# Patient Record
Sex: MALE | Race: WHITE | HISPANIC OR LATINO | Employment: OTHER | URBAN - METROPOLITAN AREA
[De-identification: names, ages, dates, MRNs, and addresses within clinical notes are randomized per-mention and may not be internally consistent; named-entity substitution may affect disease eponyms.]

---

## 2022-03-03 ENCOUNTER — OFFICE VISIT (OUTPATIENT)
Dept: DERMATOLOGY | Facility: CLINIC | Age: 77
End: 2022-03-03
Payer: MEDICARE

## 2022-03-03 ENCOUNTER — TELEPHONE (OUTPATIENT)
Dept: DERMATOLOGY | Facility: CLINIC | Age: 77
End: 2022-03-03
Payer: MEDICARE

## 2022-03-03 DIAGNOSIS — L21.9 SEBORRHEIC DERMATITIS: ICD-10-CM

## 2022-03-03 DIAGNOSIS — D18.01 CHERRY ANGIOMA: ICD-10-CM

## 2022-03-03 DIAGNOSIS — L57.0 ACTINIC KERATOSES: Primary | ICD-10-CM

## 2022-03-03 DIAGNOSIS — L82.1 SEBORRHEIC KERATOSES: ICD-10-CM

## 2022-03-03 PROCEDURE — 17000 PR DESTRUCTION(LASER SURGERY,CRYOSURGERY,CHEMOSURGERY),PREMALIGNANT LESIONS,FIRST LESION: ICD-10-PCS | Mod: S$PBB,,, | Performed by: DERMATOLOGY

## 2022-03-03 PROCEDURE — 17000 DESTRUCT PREMALG LESION: CPT | Mod: S$PBB,,, | Performed by: DERMATOLOGY

## 2022-03-03 PROCEDURE — 99203 OFFICE O/P NEW LOW 30 MIN: CPT | Mod: PBBFAC,PO | Performed by: DERMATOLOGY

## 2022-03-03 PROCEDURE — 99203 OFFICE O/P NEW LOW 30 MIN: CPT | Mod: 25,S$PBB,, | Performed by: DERMATOLOGY

## 2022-03-03 PROCEDURE — 17003 DESTRUCT PREMALG LES 2-14: CPT | Mod: PBBFAC,PO | Performed by: DERMATOLOGY

## 2022-03-03 PROCEDURE — 99999 PR PBB SHADOW E&M-NEW PATIENT-LVL III: ICD-10-PCS | Mod: PBBFAC,,, | Performed by: DERMATOLOGY

## 2022-03-03 PROCEDURE — 17003 DESTRUCT PREMALG LES 2-14: CPT | Mod: S$PBB,,, | Performed by: DERMATOLOGY

## 2022-03-03 PROCEDURE — 17000 DESTRUCT PREMALG LESION: CPT | Mod: PBBFAC,PO | Performed by: DERMATOLOGY

## 2022-03-03 PROCEDURE — 99999 PR PBB SHADOW E&M-NEW PATIENT-LVL III: CPT | Mod: PBBFAC,,, | Performed by: DERMATOLOGY

## 2022-03-03 PROCEDURE — 99203 PR OFFICE/OUTPT VISIT, NEW, LEVL III, 30-44 MIN: ICD-10-PCS | Mod: 25,S$PBB,, | Performed by: DERMATOLOGY

## 2022-03-03 PROCEDURE — 17003 DESTRUCTION, PREMALIGNANT LESIONS; SECOND THROUGH 14 LESIONS: ICD-10-PCS | Mod: S$PBB,,, | Performed by: DERMATOLOGY

## 2022-03-03 RX ORDER — KETOCONAZOLE 20 MG/G
CREAM TOPICAL
Qty: 30 G | Refills: 3 | Status: SHIPPED | OUTPATIENT
Start: 2022-03-03 | End: 2022-08-30 | Stop reason: CLARIF

## 2022-03-03 RX ORDER — VITAMIN E (DL,TOCOPHERYL ACET) 90 MG
400 CAPSULE ORAL DAILY
COMMUNITY

## 2022-03-03 RX ORDER — MAG HYDROX/ALUMINUM HYD/SIMETH 400-400-40
1 SUSPENSION, ORAL (FINAL DOSE FORM) ORAL DAILY
COMMUNITY

## 2022-03-03 RX ORDER — ASPIRIN 81 MG/1
81 TABLET ORAL DAILY
COMMUNITY

## 2022-03-03 RX ORDER — RAMIPRIL 5 MG/1
1 CAPSULE ORAL DAILY
COMMUNITY
Start: 2022-01-10

## 2022-03-03 RX ORDER — CLOPIDOGREL BISULFATE 75 MG/1
1 TABLET ORAL DAILY
COMMUNITY
Start: 2022-02-11

## 2022-03-03 RX ORDER — METOPROLOL TARTRATE 50 MG/1
1 TABLET ORAL 2 TIMES DAILY
COMMUNITY
Start: 2022-01-27

## 2022-03-03 RX ORDER — ATORVASTATIN CALCIUM 80 MG/1
40 TABLET, FILM COATED ORAL DAILY
COMMUNITY
Start: 2022-02-21

## 2022-03-03 NOTE — PATIENT INSTRUCTIONS

## 2022-03-03 NOTE — TELEPHONE ENCOUNTER
Spoke with patients wife and let them know it was okay and they could be seen but they would have to wait.     ----- Message from Carola Corona sent at 3/3/2022  2:08 PM CST -----  Contact: wife  Type: Needs Medical Advice  Who Called:  Clara Flood Call Back Number: [phone]  Additional Information: patients wife wanted to report that he would ne running late for his appt, he is coming from [place]- he will be 15-20 minutes late.

## 2022-03-03 NOTE — PROGRESS NOTES
Subjective:       Patient ID:  Elias Ventura is a 76 y.o. male who presents for   Chief Complaint   Patient presents with    Spot     scalp     New Patient    C/o spot on the scalp  Several months    Patient fell a few days ago, a lot of bruising and scrapes  Treated with triple antibiotic ointment and dermafloss    Current Outpatient Medications:     aspirin (ECOTRIN) 81 MG EC tablet, Take 81 mg by mouth., Disp: , Rfl:     atorvastatin (LIPITOR) 80 MG tablet, Take 1 tablet by mouth once daily., Disp: , Rfl:     clopidogreL (PLAVIX) 75 mg tablet, Take 1 tablet by mouth once daily., Disp: , Rfl:     co-enzyme Q-10 30 mg capsule, Take 30 mg by mouth 3 (three) times daily., Disp: , Rfl:     grape seed extract 150 mg TbSR, Take 3 capsules by mouth once daily., Disp: , Rfl:     Lactobacillus rhamnosus GG (CULTURELLE) 10 billion cell capsule, Take 1 capsule by mouth once daily., Disp: , Rfl:     metoprolol tartrate (LOPRESSOR) 50 MG tablet, Take 1 tablet by mouth 2 (two) times daily., Disp: , Rfl:     ramipriL (ALTACE) 5 MG capsule, Take 1 capsule by mouth once daily., Disp: , Rfl:     saw palmetto 450 mg Cap, Take by mouth., Disp: , Rfl:     TURMERIC ORAL, Take by mouth., Disp: , Rfl:         Review of Systems   Constitutional: Negative for fever, chills and fatigue.   Respiratory: Negative for cough and shortness of breath.    Skin: Positive for activity-related sunscreen use and wears hat. Negative for daily sunscreen use.        Objective:    Physical Exam   Constitutional: He appears well-developed and well-nourished. No distress.   Neurological: He is alert and oriented to person, place, and time. He is not disoriented.   Psychiatric: He has a normal mood and affect.   Skin:   Areas Examined (abnormalities noted in diagram):   Scalp / Hair Palpated and Inspected  Head / Face Inspection Performed  Neck Inspection Performed  Chest / Axilla Inspection Performed  Abdomen Inspection Performed  Back  Inspection Performed  RUE Inspected  LUE Inspection Performed  Nails and Digits Inspection Performed                       Diagram Legend     Erythematous scaling macule/papule c/w actinic keratosis       Vascular papule c/w angioma      Pigmented verrucoid papule/plaque c/w seborrheic keratosis      Yellow umbilicated papule c/w sebaceous hyperplasia      Irregularly shaped tan macule c/w lentigo     1-2 mm smooth white papules consistent with Milia      Movable subcutaneous cyst with punctum c/w epidermal inclusion cyst      Subcutaneous movable cyst c/w pilar cyst      Firm pink to brown papule c/w dermatofibroma      Pedunculated fleshy papule(s) c/w skin tag(s)      Evenly pigmented macule c/w junctional nevus     Mildly variegated pigmented, slightly irregular-bordered macule c/w mildly atypical nevus      Flesh colored to evenly pigmented papule c/w intradermal nevus       Pink pearly papule/plaque c/w basal cell carcinoma      Erythematous hyperkeratotic cursted plaque c/w SCC      Surgical scar with no sign of skin cancer recurrence      Open and closed comedones      Inflammatory papules and pustules      Verrucoid papule consistent consistent with wart     Erythematous eczematous patches and plaques     Dystrophic onycholytic nail with subungual debris c/w onychomycosis     Umbilicated papule    Erythematous-base heme-crusted tan verrucoid plaque consistent with inflamed seborrheic keratosis     Erythematous Silvery Scaling Plaque c/w Psoriasis     See annotation      Assessment / Plan:        Actinic keratoses  Cryosurgery Procedure Note    Verbal consent from the patient is obtained and the patient is aware of the precancerous quality and need for treatment of these lesions. Liquid nitrogen cryosurgery is applied to the 3 actinic keratoses, as detailed in the physical exam, to produce a freeze injury. The patient is aware that blisters may form and is instructed on wound care with gentle cleansing and  use of vaseline ointment to keep moist until healed. The patient is supplied a handout on cryosurgery and is instructed to call if lesions do not completely resolve.    Discussed field treatment with fluorouracil cream if more lesions develo    Patient instructed in importance in daily broad spectrum sun protection of at least spf 30. Mineral sunscreen ingredients preferred (Zinc +/- Titanium) and can be found OTC.   Recommend Elta MD for daily use on face and neck.  Patient encouraged to wear hat for all outdoor exposure.   Also discussed sun avoidance and use of protective clothing.    Seborrheic dermatitis  Mild, central face  Add keto cream to central face as needed for flare  May add OTC hydrocortisone as well    Cherry angioma  This is a benign vascular lesion. Reassurance given. No treatment required.     Seborrheic keratoses  These are benign inherited growths without a malignant potential. Reassurance given to patient. No treatment is necessary.     Patient instructed in importance in daily broad spectrum sun protection of at least spf 30. Mineral sunscreen ingredients preferred (Zinc +/- Titanium) and can be found OTC.   Recommend Elta MD for daily use on face and neck.  Patient encouraged to wear hat for all outdoor exposure.   Also discussed sun avoidance and use of protective clothing.         Follow up in about 6 months (around 9/3/2022), or if symptoms worsen or fail to improve.

## 2023-03-27 PROBLEM — I70.0 AORTIC ATHEROSCLEROSIS: Status: ACTIVE | Noted: 2023-03-27

## 2023-05-23 ENCOUNTER — OFFICE VISIT (OUTPATIENT)
Dept: CARDIOLOGY | Facility: CLINIC | Age: 78
End: 2023-05-23
Payer: MEDICARE

## 2023-05-23 VITALS
BODY MASS INDEX: 28.09 KG/M2 | WEIGHT: 200.63 LBS | DIASTOLIC BLOOD PRESSURE: 62 MMHG | SYSTOLIC BLOOD PRESSURE: 138 MMHG | HEART RATE: 67 BPM | HEIGHT: 71 IN

## 2023-05-23 DIAGNOSIS — R06.02 SOB (SHORTNESS OF BREATH): ICD-10-CM

## 2023-05-23 DIAGNOSIS — R09.89 RIGHT CAROTID BRUIT: ICD-10-CM

## 2023-05-23 DIAGNOSIS — E78.00 PURE HYPERCHOLESTEROLEMIA: Chronic | ICD-10-CM

## 2023-05-23 DIAGNOSIS — I25.10 CORONARY ARTERY DISEASE DUE TO LIPID RICH PLAQUE: Primary | ICD-10-CM

## 2023-05-23 DIAGNOSIS — Z79.02 LONG TERM (CURRENT) USE OF ANTITHROMBOTICS/ANTIPLATELETS: ICD-10-CM

## 2023-05-23 DIAGNOSIS — I25.83 CORONARY ARTERY DISEASE DUE TO LIPID RICH PLAQUE: Primary | ICD-10-CM

## 2023-05-23 DIAGNOSIS — I10 PRIMARY HYPERTENSION: Chronic | ICD-10-CM

## 2023-05-23 DIAGNOSIS — I35.1 NONRHEUMATIC AORTIC VALVE INSUFFICIENCY: ICD-10-CM

## 2023-05-23 DIAGNOSIS — E66.3 OVERWEIGHT (BMI 25.0-29.9): Chronic | ICD-10-CM

## 2023-05-23 DIAGNOSIS — I70.0 AORTIC ATHEROSCLEROSIS: Chronic | ICD-10-CM

## 2023-05-23 DIAGNOSIS — I20.9 ANGINA PECTORIS, UNSPECIFIED: ICD-10-CM

## 2023-05-23 PROCEDURE — 93005 ELECTROCARDIOGRAM TRACING: CPT | Mod: PO

## 2023-05-23 PROCEDURE — 99215 OFFICE O/P EST HI 40 MIN: CPT | Mod: PBBFAC,PO | Performed by: INTERNAL MEDICINE

## 2023-05-23 PROCEDURE — 99999 PR PBB SHADOW E&M-EST. PATIENT-LVL V: ICD-10-PCS | Mod: PBBFAC,,, | Performed by: INTERNAL MEDICINE

## 2023-05-23 PROCEDURE — 93010 ELECTROCARDIOGRAM REPORT: CPT | Mod: ,,, | Performed by: INTERNAL MEDICINE

## 2023-05-23 PROCEDURE — 99204 OFFICE O/P NEW MOD 45 MIN: CPT | Mod: 25,S$PBB,, | Performed by: INTERNAL MEDICINE

## 2023-05-23 PROCEDURE — 99999 PR PBB SHADOW E&M-EST. PATIENT-LVL V: CPT | Mod: PBBFAC,,, | Performed by: INTERNAL MEDICINE

## 2023-05-23 PROCEDURE — 93010 EKG 12-LEAD: ICD-10-PCS | Mod: ,,, | Performed by: INTERNAL MEDICINE

## 2023-05-23 PROCEDURE — 99204 PR OFFICE/OUTPT VISIT, NEW, LEVL IV, 45-59 MIN: ICD-10-PCS | Mod: 25,S$PBB,, | Performed by: INTERNAL MEDICINE

## 2023-05-23 NOTE — PROGRESS NOTES
Subjective:    Patient ID:  Elias Ventura is a 77 y.o. male who presents for Coronary Artery Disease and Valvular Heart Disease        HPI  NEW PATIENT EVALUATION WAS FOLLOWED BY DR. NUÑEZ HISTORY OF NON STEMI PCI OF THE LAD WITH INSTENT RESTENOSIS HAD RESIDUAL LAD DISEASE AND RCA DISEASE WAS TO BE BROUGHT BACK, ECHO AT THAT TIME EF 50% MILD AI, TOLD NEEDS CABG, PT DOES NOT LIKE IT, NO SX,IN CARDIAC REHAB,NO TOBACCO FOR MANY YEARS, CBC OK, , CMP OK, ,LDL 50, HDL 48, , TG 53, TSH OK,  VERY INTERESTED SUPPLEMENTS AND NONTRADITIONAL MEDICINE SEE ROS    Past Medical History:   Diagnosis Date    Coronary artery disease     Hyperlipemia     Hypertension      Past Surgical History:   Procedure Laterality Date    LEFT HEART CATHETERIZATION Left 9/1/2022    Procedure: Left heart cath;  Surgeon: Kiko Alonzo MD;  Location: [facility] CATH;  Service: Cardiovascular;  Laterality: Left;     No family history on file.  Social History     Socioeconomic History    Marital status:    Tobacco Use    Smoking status: Never    Smokeless tobacco: Never   Substance and Sexual Activity    Alcohol use: Not Currently    Drug use: Never       Review of patient's allergies indicates:  No Known Allergies    Current Outpatient Medications:     aspirin (ECOTRIN) 81 MG EC tablet, Take 81 mg by mouth once daily., Disp: , Rfl:     atorvastatin (LIPITOR) 80 MG tablet, Take 40 mg by mouth once daily., Disp: , Rfl:     cholecalciferol, vitamin D3, 125 mcg (5,000 unit) capsule, Take 5,000 Units by mouth once daily., Disp: , Rfl:     clopidogreL (PLAVIX) 75 mg tablet, Take 1 tablet by mouth once daily., Disp: , Rfl:     coenzyme Q10 400 mg Cap, Take 400 mg by mouth once daily., Disp: , Rfl:     glucosamine/chondr acharya A sod (OSTEO BI-FLEX ORAL), Take 1 capsule by mouth once daily., Disp: , Rfl:     grape seed extract 150 mg TbSR, Take 3 capsules by mouth once daily., Disp: , Rfl:     Lactobacillus rhamnosus GG (CULTURELLE) 10 billion cell  capsule, Take 1 capsule by mouth once daily., Disp: , Rfl:     lutein 20 mg Cap, Take 1 capsule by mouth once daily., Disp: , Rfl:     metoprolol tartrate (LOPRESSOR) 50 MG tablet, Take 1 tablet by mouth 2 (two) times daily., Disp: , Rfl:     mv-min/vit C/glut/lysine/hb124 (IMMUNE SUPPORT ORAL), Take 1 tablet by mouth once daily., Disp: , Rfl:     omega-3/dha/epa/fish oil (FISH OIL HIGH POTENCY ORAL), Take 1 capsule by mouth once daily., Disp: , Rfl:     ramipriL (ALTACE) 5 MG capsule, Take 1 capsule by mouth once daily., Disp: , Rfl:     resveratroL 100 mg Cap, Take by mouth., Disp: , Rfl:     saw palmetto 450 mg Cap, Take 1 capsule by mouth once daily., Disp: , Rfl:     tamsulosin (FLOMAX) 0.4 mg Cap, Take 1 capsule by mouth once daily., Disp: , Rfl:     vitamin E 400 UNIT capsule, Take 400 Units by mouth once daily., Disp: , Rfl:     zinc gluconate 50 mg tablet, Take 50 mg by mouth once daily., Disp: , Rfl:     Review of Systems   Constitutional: Negative for chills, decreased appetite, diaphoresis, fever, malaise/fatigue and night sweats.   HENT:  Positive for hearing loss. Negative for congestion and nosebleeds.    Eyes:  Negative for blurred vision and visual disturbance.   Cardiovascular:  Negative for chest pain, claudication, cyanosis, dyspnea on exertion, irregular heartbeat, leg swelling (OCC R AVRICOSE VEINS), near-syncope, orthopnea, palpitations, paroxysmal nocturnal dyspnea and syncope.   Endocrine: Negative for polyphagia and polyuria.   Hematologic/Lymphatic: Negative for adenopathy. Does not bruise/bleed easily.   Musculoskeletal:  Positive for back pain (/ SCIATICA). Negative for falls and joint swelling.   Gastrointestinal:  Negative for abdominal pain, dysphagia, heartburn, jaundice, melena, nausea and vomiting.   Genitourinary:  Negative for dysuria and flank pain.   Neurological:  Positive for paresthesias. Negative for brief paralysis, dizziness, focal weakness, headaches, loss of balance and  "weakness.   Psychiatric/Behavioral:  Negative for altered mental status and depression.    Allergic/Immunologic: Negative for hives and persistent infections.      Objective:      Vitals:    05/23/23 1535 05/23/23 1604   BP: (!) 153/74 138/62   Pulse: 67    Weight: 91 kg (200 lb 9.9 oz)    Height: 5' 11" (1.803 m)    PainSc: 0-No pain      Body mass index is 27.98 kg/m².    Physical Exam  Constitutional:       Appearance: Normal appearance.   HENT:      Head: Normocephalic and atraumatic.   Eyes:      Extraocular Movements: Extraocular movements intact.      Conjunctiva/sclera: Conjunctivae normal.   Neck:      Vascular: Carotid bruit present. No JVD.   Cardiovascular:      Rate and Rhythm: Normal rate and regular rhythm. No extrasystoles are present.     Pulses:           Carotid pulses are 2+ on the right side with bruit and 2+ on the left side.       Radial pulses are 2+ on the right side and 2+ on the left side.        Posterior tibial pulses are 2+ on the right side and 2+ on the left side.      Heart sounds: Murmur heard.   Diastolic murmur is present with a grade of 2/4 at the upper right sternal border.     No friction rub. No gallop.   Pulmonary:      Effort: Pulmonary effort is normal.      Breath sounds: Normal breath sounds and air entry.   Abdominal:      Palpations: Abdomen is soft.      Tenderness: There is no abdominal tenderness.   Musculoskeletal:      Cervical back: Neck supple.      Right lower leg: No edema.      Left lower leg: No edema.   Neurological:      General: No focal deficit present.      Mental Status: He is alert and oriented to person, place, and time.      Cranial Nerves: Cranial nerve deficit ([place]) present.   Psychiatric:         Mood and Affect: Mood normal.         Speech: Speech normal.         Behavior: Behavior normal.               ..    Chemistry        Component Value Date/Time     09/01/2022 0847    K 3.7 09/01/2022 0847     09/01/2022 0847    CO2 25 " 09/01/2022 0847    BUN 13 09/01/2022 0847    CREATININE 0.77 09/01/2022 0847    GLU 87 09/01/2022 0847        Component Value Date/Time    CALCIUM 7.7 (L) 09/01/2022 0847    ALKPHOS 47 09/01/2022 0847    AST 55 09/01/2022 0847    ALT 19 09/01/2022 0847    BILITOT 1.3 09/01/2022 0847            ..No results found for: CHOL  No results found for: HDL  No results found for: LDLCALC  No results found for: TRIG  No results found for: CHOLHDL  ..  Lab Results   Component Value Date    WBC 8.50 09/01/2022    HGB 13.8 (L) 09/01/2022    HCT 39.8 (L) 09/01/2022    MCV 87 09/01/2022     09/01/2022       Test(s) Reviewed  I have reviewed the following in detail:  [] Stress test   [x] Angiography   [x] Echocardiogram   [x] Labs   [] Other:       Assessment:         ICD-10-CM ICD-9-CM   1. Coronary artery disease due to lipid rich plaque  I25.10 414.00    I25.83 414.3   2. Nonrheumatic aortic valve insufficiency  I35.1 424.1   3. Long term (current) use of antithrombotics/antiplatelets  Z79.02 V58.63   4. Aortic atherosclerosis  I70.0 440.0   5. Primary hypertension  I10 401.9   6. Pure hypercholesterolemia  E78.00 272.0   7. Overweight (BMI 25.0-29.9)  E66.3 278.02   8. Right carotid bruit  R09.89 785.9   9. SOB (shortness of breath)  R06.02 786.05   10. Angina pectoris, unspecified  I20.9 413.9     Problem List Items Addressed This Visit          Cardiac/Vascular    Coronary artery disease - Primary    Relevant Orders    IN OFFICE EKG 12-LEAD (to [place])    Echo    Nuclear Stress - Cardiology Interpreted    Hyperlipemia    Hypertension    Aortic atherosclerosis    Nonrheumatic aortic valve insufficiency    Relevant Orders    Echo    Right carotid bruit    Relevant Orders    CV Ultrasound Bilateral Doppler Carotid       Hematology    Long term (current) use of antithrombotics/antiplatelets       Endocrine    Overweight (BMI 25.0-29.9)     Other Visit Diagnoses       SOB (shortness of breath)        Angina pectoris,  unspecified        Relevant Orders    Nuclear Stress - Cardiology Interpreted             Plan:         EKG SR SEPTAL Q, WILL NEED FURTHER EVALUATION CHECK LABS ECHO NUCLEAR STRESS TEST TO ASSESS FOR ISCHEMIA CAROTID ULTRASOUND WATCH BLOOD PRESSURE, VERY LONG DISCUSSION WITH THE PATIENT ABOUT NONTRADITIONAL MEDICATIONS AND SUPPLEMENTS, ALSO ASKED ABOUT CORRELATION, NO INDICATION FOR THAT, NO CLEAR ANGINA NO OVERT HEART FAILURE NO TIA TYPE SYMPTOMS NO NEAR-SYNCOPE DIET EXERCISE DISCUSSED PLAN WITH THE PATIENT HIS WIFE RETURN TO CLINIC IN FEW WEEKS AFTER TESTS   Coronary artery disease due to lipid rich plaque  -     IN OFFICE EKG 12-LEAD (to [place])  -     Echo  -     Nuclear Stress - Cardiology Interpreted; Future    Nonrheumatic aortic valve insufficiency  -     Echo    Long term (current) use of antithrombotics/antiplatelets    Aortic atherosclerosis    Primary hypertension    Pure hypercholesterolemia    Overweight (BMI 25.0-29.9)    Right carotid bruit  -     CV Ultrasound Bilateral Doppler Carotid; Future    SOB (shortness of breath)    Angina pectoris, unspecified  -     Nuclear Stress - Cardiology Interpreted; Future    RTC Low level/low impact aerobic exercise 5x's/wk. Heart healthy diet and risk factor modification.    See labs and med orders.    Aerobic exercise 5x's/wk. Heart healthy diet and risk factor modification.    See labs and med orders.

## 2023-05-30 ENCOUNTER — PATIENT MESSAGE (OUTPATIENT)
Dept: CARDIOLOGY | Facility: HOSPITAL | Age: 78
End: 2023-05-30
Payer: MEDICARE

## 2023-06-01 ENCOUNTER — CLINICAL SUPPORT (OUTPATIENT)
Dept: CARDIOLOGY | Facility: HOSPITAL | Age: 78
End: 2023-06-01
Attending: INTERNAL MEDICINE
Payer: MEDICARE

## 2023-06-01 ENCOUNTER — HOSPITAL ENCOUNTER (OUTPATIENT)
Dept: RADIOLOGY | Facility: HOSPITAL | Age: 78
Discharge: HOME | End: 2023-06-01
Attending: INTERNAL MEDICINE
Payer: MEDICARE

## 2023-06-01 ENCOUNTER — TELEPHONE (OUTPATIENT)
Dept: CARDIOLOGY | Facility: CLINIC | Age: 78
End: 2023-06-01
Payer: MEDICARE

## 2023-06-01 VITALS — WEIGHT: 200 LBS | HEIGHT: 71 IN | BODY MASS INDEX: 28 KG/M2

## 2023-06-01 DIAGNOSIS — I20.9 ANGINA PECTORIS, UNSPECIFIED: ICD-10-CM

## 2023-06-01 DIAGNOSIS — I25.10 CORONARY ARTERY DISEASE DUE TO LIPID RICH PLAQUE: Chronic | ICD-10-CM

## 2023-06-01 DIAGNOSIS — I25.83 CORONARY ARTERY DISEASE DUE TO LIPID RICH PLAQUE: Chronic | ICD-10-CM

## 2023-06-01 LAB
CV PHARM DOSE: 0.4 MG
CV STRESS BASE HR: 64 BPM
DIASTOLIC BLOOD PRESSURE: 64 MMHG
NUC REST EJECTION FRACTION: 62
OHS CV CPX 1 MINUTE RECOVERY HEART RATE: 82 BPM
OHS CV CPX 85 PERCENT MAX PREDICTED HEART RATE MALE: 122
OHS CV CPX MAX PREDICTED HEART RATE: 143
OHS CV CPX PATIENT IS FEMALE: 0
OHS CV CPX PATIENT IS MALE: 1
OHS CV CPX PEAK DIASTOLIC BLOOD PRESSURE: 64 MMHG
OHS CV CPX PEAK HEAR RATE: 82 BPM
OHS CV CPX PEAK RATE PRESSURE PRODUCT: NORMAL
OHS CV CPX PEAK SYSTOLIC BLOOD PRESSURE: 155 MMHG
OHS CV CPX PERCENT MAX PREDICTED HEART RATE ACHIEVED: 57
OHS CV CPX RATE PRESSURE PRODUCT PRESENTING: 9920
OHS CV PHARM TIME: 1505 MIN
SYSTOLIC BLOOD PRESSURE: 155 MMHG

## 2023-06-01 PROCEDURE — 93017 CV STRESS TEST TRACING ONLY: CPT | Mod: PO

## 2023-06-01 PROCEDURE — 63600175 PHARM REV CODE 636 W HCPCS: Mod: PO | Performed by: INTERNAL MEDICINE

## 2023-06-01 PROCEDURE — 93016 CV STRESS TEST SUPVJ ONLY: CPT | Mod: ,,, | Performed by: INTERNAL MEDICINE

## 2023-06-01 PROCEDURE — A9502 TC99M TETROFOSMIN: HCPCS | Mod: PO

## 2023-06-01 PROCEDURE — 93016 NUCLEAR STRESS - CARDIOLOGY INTERPRETED (CUPID ONLY): ICD-10-PCS | Mod: ,,, | Performed by: INTERNAL MEDICINE

## 2023-06-01 PROCEDURE — 78452 HT MUSCLE IMAGE SPECT MULT: CPT | Mod: 26,,, | Performed by: INTERNAL MEDICINE

## 2023-06-01 PROCEDURE — 93018 PR CARDIAC STRESS TST,INTERP/REPT ONLY: ICD-10-PCS | Mod: ,,, | Performed by: INTERNAL MEDICINE

## 2023-06-01 PROCEDURE — 78452 NUCLEAR STRESS - CARDIOLOGY INTERPRETED (CUPID ONLY): ICD-10-PCS | Mod: 26,,, | Performed by: INTERNAL MEDICINE

## 2023-06-01 PROCEDURE — 78452 HT MUSCLE IMAGE SPECT MULT: CPT | Mod: PO

## 2023-06-01 PROCEDURE — 93018 CV STRESS TEST I&R ONLY: CPT | Mod: ,,, | Performed by: INTERNAL MEDICINE

## 2023-06-01 RX ORDER — REGADENOSON 0.08 MG/ML
0.4 INJECTION, SOLUTION INTRAVENOUS
Status: COMPLETED | OUTPATIENT
Start: 2023-06-01 | End: 2023-06-01

## 2023-06-01 RX ADMIN — REGADENOSON 0.4 MG: 0.08 INJECTION, SOLUTION INTRAVENOUS at 03:06

## 2023-06-01 NOTE — TELEPHONE ENCOUNTER
----- Message from Felisha Lauren sent at 6/1/2023 10:55 AM CDT -----  Regarding: advise  Contact: patient  Type: Needs Medical Advice  Who Called:  Patient  Symptoms (please be specific):    How long has patient had these symptoms:   Pharmacy name and phone #:    Best Call Back Number: [phone]    Additional Information: I have Elias Caldwellr MRN: [id number] inquiring about a test he has to take. Are you available to assist?

## 2023-06-13 ENCOUNTER — CLINICAL SUPPORT (OUTPATIENT)
Dept: CARDIOLOGY | Facility: HOSPITAL | Age: 78
End: 2023-06-13
Attending: INTERNAL MEDICINE
Payer: MEDICARE

## 2023-06-13 VITALS
BODY MASS INDEX: 28 KG/M2 | HEIGHT: 71 IN | DIASTOLIC BLOOD PRESSURE: 62 MMHG | WEIGHT: 200 LBS | SYSTOLIC BLOOD PRESSURE: 138 MMHG

## 2023-06-13 DIAGNOSIS — R09.89 RIGHT CAROTID BRUIT: ICD-10-CM

## 2023-06-13 LAB
ASCENDING AORTA: 3.56 CM
AV INDEX (PROSTH): 0.83
AV MEAN GRADIENT: 5 MMHG
AV PEAK GRADIENT: 10 MMHG
AV REGURGITATION PRESSURE HALF TIME: 469.43 MS
AV VALVE AREA: 3.25 CM2
AV VELOCITY RATIO: 0.75
BSA FOR ECHO PROCEDURE: 2.13 M2
CV ECHO LV RWT: 0.43 CM
DOP CALC AO PEAK VEL: 1.58 M/S
DOP CALC AO VTI: 34.3 CM
DOP CALC LVOT AREA: 3.9 CM2
DOP CALC LVOT DIAMETER: 2.24 CM
DOP CALC LVOT PEAK VEL: 1.18 M/S
DOP CALC LVOT STROKE VOLUME: 111.47 CM3
DOP CALCLVOT PEAK VEL VTI: 28.3 CM
E WAVE DECELERATION TIME: 243.3 MSEC
E/A RATIO: 0.79
E/E' RATIO: 12.71 M/S
ECHO LV POSTERIOR WALL: 1.01 CM (ref 0.6–1.1)
EJECTION FRACTION: 60 %
FRACTIONAL SHORTENING: 32 % (ref 28–44)
INTERVENTRICULAR SEPTUM: 1.03 CM (ref 0.6–1.1)
IVRT: 100.86 MSEC
LA MAJOR: 5.15 CM
LA MINOR: 5.19 CM
LA WIDTH: 4.4 CM
LEFT ARM DIASTOLIC BLOOD PRESSURE: 62 MMHG
LEFT ARM SYSTOLIC BLOOD PRESSURE: 138 MMHG
LEFT ATRIUM SIZE: 3.9 CM
LEFT ATRIUM VOLUME INDEX: 35.7 ML/M2
LEFT ATRIUM VOLUME: 75.41 CM3
LEFT CBA DIAS: 11 CM/S
LEFT CBA SYS: 101 CM/S
LEFT CCA DIST DIAS: 9 CM/S
LEFT CCA DIST SYS: 89 CM/S
LEFT CCA MID DIAS: 9 CM/S
LEFT CCA MID SYS: 107 CM/S
LEFT CCA PROX DIAS: 14 CM/S
LEFT CCA PROX SYS: 120 CM/S
LEFT ECA DIAS: 7 CM/S
LEFT ECA SYS: 113 CM/S
LEFT ICA DIST DIAS: 25 CM/S
LEFT ICA DIST SYS: 113 CM/S
LEFT ICA MID DIAS: 21 CM/S
LEFT ICA MID SYS: 99 CM/S
LEFT ICA PROX DIAS: 15 CM/S
LEFT ICA PROX SYS: 97 CM/S
LEFT INTERNAL DIMENSION IN SYSTOLE: 3.2 CM (ref 2.1–4)
LEFT VENTRICLE DIASTOLIC VOLUME INDEX: 48.12 ML/M2
LEFT VENTRICLE DIASTOLIC VOLUME: 101.54 ML
LEFT VENTRICLE MASS INDEX: 80 G/M2
LEFT VENTRICLE SYSTOLIC VOLUME INDEX: 19.4 ML/M2
LEFT VENTRICLE SYSTOLIC VOLUME: 40.88 ML
LEFT VENTRICULAR INTERNAL DIMENSION IN DIASTOLE: 4.68 CM (ref 3.5–6)
LEFT VENTRICULAR MASS: 167.8 G
LEFT VERTEBRAL DIAS: 11 CM/S
LEFT VERTEBRAL SYS: 63 CM/S
LV LATERAL E/E' RATIO: 14.83 M/S
LV SEPTAL E/E' RATIO: 11.13 M/S
LVOT MG: 3.13 MMHG
LVOT MV: 0.84 CM/S
MV PEAK A VEL: 1.13 M/S
MV PEAK E VEL: 0.89 M/S
MV STENOSIS PRESSURE HALF TIME: 70.56 MS
MV VALVE AREA P 1/2 METHOD: 3.12 CM2
OHS CV CAROTID RIGHT ICA EDV HIGHEST: 21
OHS CV CAROTID ULTRASOUND LEFT ICA/CCA RATIO: 1.27
OHS CV CAROTID ULTRASOUND RIGHT ICA/CCA RATIO: 0.98
OHS CV PV CAROTID LEFT HIGHEST CCA: 120
OHS CV PV CAROTID LEFT HIGHEST ICA: 113
OHS CV PV CAROTID RIGHT HIGHEST CCA: 117
OHS CV PV CAROTID RIGHT HIGHEST ICA: 101
OHS CV US CAROTID LEFT HIGHEST EDV: 25
PISA AR MAX VEL: 3.12 M/S
PISA TR MAX VEL: 2.55 M/S
PULM VEIN S/D RATIO: 1.13
PV PEAK D VEL: 0.69 M/S
PV PEAK S VEL: 0.78 M/S
RA MAJOR: 5.08 CM
RA PRESSURE: 3 MMHG
RA WIDTH: 3.7 CM
RIGHT ARM DIASTOLIC BLOOD PRESSURE: 62 MMHG
RIGHT ARM SYSTOLIC BLOOD PRESSURE: 138 MMHG
RIGHT CBA DIAS: 11 CM/S
RIGHT CBA SYS: 89 CM/S
RIGHT CCA DIST DIAS: 10 CM/S
RIGHT CCA DIST SYS: 103 CM/S
RIGHT CCA MID DIAS: 10 CM/S
RIGHT CCA MID SYS: 114 CM/S
RIGHT CCA PROX DIAS: 7 CM/S
RIGHT CCA PROX SYS: 117 CM/S
RIGHT ECA DIAS: 7 CM/S
RIGHT ECA SYS: 169 CM/S
RIGHT ICA DIST DIAS: 20 CM/S
RIGHT ICA DIST SYS: 95 CM/S
RIGHT ICA MID DIAS: 21 CM/S
RIGHT ICA MID SYS: 101 CM/S
RIGHT ICA PROX DIAS: 20 CM/S
RIGHT ICA PROX SYS: 95 CM/S
RIGHT VENTRICULAR END-DIASTOLIC DIMENSION: 3.7 CM
RIGHT VENTRICULAR LENGTH IN DIASTOLE (APICAL 4-CHAMBER VIEW): 7.54 CM
RIGHT VERTEBRAL DIAS: 11 CM/S
RIGHT VERTEBRAL SYS: 46 CM/S
RV MID DIAMA: 2.54 CM
RV TISSUE DOPPLER FREE WALL SYSTOLIC VELOCITY 1 (APICAL 4 CHAMBER VIEW): 0.02 CM/S
SINUS: 3.58 CM
STJ: 3.47 CM
TDI LATERAL: 0.06 M/S
TDI SEPTAL: 0.08 M/S
TDI: 0.07 M/S
TR MAX PG: 26 MMHG
TRICUSPID ANNULAR PLANE SYSTOLIC EXCURSION: 2.43 CM
TV REST PULMONARY ARTERY PRESSURE: 29 MMHG

## 2023-06-13 PROCEDURE — 93880 EXTRACRANIAL BILAT STUDY: CPT | Mod: PO

## 2023-06-13 PROCEDURE — 93880 EXTRACRANIAL BILAT STUDY: CPT | Mod: 26,,, | Performed by: INTERNAL MEDICINE

## 2023-06-13 PROCEDURE — 93306 TTE W/DOPPLER COMPLETE: CPT | Mod: PO

## 2023-06-13 PROCEDURE — 93880 CV US DOPPLER CAROTID (CUPID ONLY): ICD-10-PCS | Mod: 26,,, | Performed by: INTERNAL MEDICINE

## 2023-06-23 ENCOUNTER — PATIENT MESSAGE (OUTPATIENT)
Dept: CARDIOLOGY | Facility: CLINIC | Age: 78
End: 2023-06-23
Payer: MEDICARE

## 2023-08-03 ENCOUNTER — OFFICE VISIT (OUTPATIENT)
Dept: CARDIOLOGY | Facility: CLINIC | Age: 78
End: 2023-08-03
Payer: MEDICARE

## 2023-08-03 VITALS
WEIGHT: 216.06 LBS | DIASTOLIC BLOOD PRESSURE: 66 MMHG | BODY MASS INDEX: 30.25 KG/M2 | HEIGHT: 71 IN | SYSTOLIC BLOOD PRESSURE: 130 MMHG | HEART RATE: 67 BPM

## 2023-08-03 DIAGNOSIS — I65.23 CAROTID ARTERY PLAQUE, BILATERAL: Chronic | ICD-10-CM

## 2023-08-03 DIAGNOSIS — E66.9 OBESITY, CLASS I, BMI 30-34.9: Chronic | ICD-10-CM

## 2023-08-03 DIAGNOSIS — E78.00 PURE HYPERCHOLESTEROLEMIA: Chronic | ICD-10-CM

## 2023-08-03 DIAGNOSIS — I77.810 MILD ASCENDING AORTA DILATATION: ICD-10-CM

## 2023-08-03 DIAGNOSIS — I35.1 NONRHEUMATIC AORTIC VALVE INSUFFICIENCY: Primary | Chronic | ICD-10-CM

## 2023-08-03 DIAGNOSIS — I70.0 AORTIC ATHEROSCLEROSIS: Chronic | ICD-10-CM

## 2023-08-03 DIAGNOSIS — I25.10 CORONARY ARTERY DISEASE INVOLVING NATIVE CORONARY ARTERY OF NATIVE HEART WITHOUT ANGINA PECTORIS: Chronic | ICD-10-CM

## 2023-08-03 DIAGNOSIS — I10 PRIMARY HYPERTENSION: Chronic | ICD-10-CM

## 2023-08-03 PROBLEM — E66.811 OBESITY, CLASS I, BMI 30-34.9: Status: ACTIVE | Noted: 2023-05-23

## 2023-08-03 PROCEDURE — 99999 PR PBB SHADOW E&M-EST. PATIENT-LVL III: CPT | Mod: PBBFAC,,, | Performed by: INTERNAL MEDICINE

## 2023-08-03 PROCEDURE — 99214 PR OFFICE/OUTPT VISIT, EST, LEVL IV, 30-39 MIN: ICD-10-PCS | Mod: S$PBB,,, | Performed by: INTERNAL MEDICINE

## 2023-08-03 PROCEDURE — 99213 OFFICE O/P EST LOW 20 MIN: CPT | Mod: PBBFAC,PO | Performed by: INTERNAL MEDICINE

## 2023-08-03 PROCEDURE — 99999 PR PBB SHADOW E&M-EST. PATIENT-LVL III: ICD-10-PCS | Mod: PBBFAC,,, | Performed by: INTERNAL MEDICINE

## 2023-08-03 PROCEDURE — 99214 OFFICE O/P EST MOD 30 MIN: CPT | Mod: S$PBB,,, | Performed by: INTERNAL MEDICINE

## 2023-08-03 NOTE — PROGRESS NOTES
Subjective:    Patient ID:  Elias Ventura is a 77 y.o. male who presents for Coronary Artery Disease        HPI    DISCUSSED TESTS,  ABNORMAL NUCLEAR STRESS TEST MOSTLY FIXED DEFECT,, ECHO NORMAL LV FUNCTION MILD-TO-MODERATE AI MILDLY TO MODERATELY DILATED ASCENDING AORTA, CAROTID ULTRASOUND WITH MILD BILATERAL PLAQUE NO STENOSIS, DOING WELL, BETTER AFTER REHAB, INTERESTED IN SUPPLEMENTS, SEE ROS  Past Medical History:   Diagnosis Date    Coronary artery disease     Hyperlipemia     Hypertension      Past Surgical History:   Procedure Laterality Date    LEFT HEART CATHETERIZATION Left 9/1/2022    Procedure: Left heart cath;  Surgeon: Kiko Alonzo MD;  Location: [facility] CATH;  Service: Cardiovascular;  Laterality: Left;     No family history on file.  Social History     Socioeconomic History    Marital status:    Tobacco Use    Smoking status: Never    Smokeless tobacco: Never   Substance and Sexual Activity    Alcohol use: Not Currently    Drug use: Never       Review of patient's allergies indicates:  No Known Allergies    Current Outpatient Medications:     aspirin (ECOTRIN) 81 MG EC tablet, Take 81 mg by mouth once daily., Disp: , Rfl:     atorvastatin (LIPITOR) 80 MG tablet, Take 40 mg by mouth once daily., Disp: , Rfl:     cholecalciferol, vitamin D3, 125 mcg (5,000 unit) capsule, Take 5,000 Units by mouth once daily., Disp: , Rfl:     clopidogreL (PLAVIX) 75 mg tablet, Take 1 tablet by mouth once daily., Disp: , Rfl:     coenzyme Q10 400 mg Cap, Take 400 mg by mouth once daily., Disp: , Rfl:     glucosamine/chondr acharya A sod (OSTEO BI-FLEX ORAL), Take 1 capsule by mouth once daily., Disp: , Rfl:     grape seed extract 150 mg TbSR, Take 3 capsules by mouth once daily., Disp: , Rfl:     Lactobacillus rhamnosus GG (CULTURELLE) 10 billion cell capsule, Take 1 capsule by mouth once daily., Disp: , Rfl:     lutein 20 mg Cap, Take 1 capsule by mouth once daily., Disp: , Rfl:     metoprolol tartrate  "(LOPRESSOR) 50 MG tablet, Take 1 tablet by mouth 2 (two) times daily., Disp: , Rfl:     mv-min/vit C/glut/lysine/hb124 (IMMUNE SUPPORT ORAL), Take 1 tablet by mouth once daily., Disp: , Rfl:     omega-3/dha/epa/fish oil (FISH OIL HIGH POTENCY ORAL), Take 1 capsule by mouth once daily., Disp: , Rfl:     ramipriL (ALTACE) 5 MG capsule, Take 1 capsule by mouth once daily., Disp: , Rfl:     resveratroL 100 mg Cap, Take by mouth., Disp: , Rfl:     saw palmetto 450 mg Cap, Take 1 capsule by mouth once daily., Disp: , Rfl:     tamsulosin (FLOMAX) 0.4 mg Cap, Take 1 capsule by mouth once daily., Disp: , Rfl:     vitamin E 400 UNIT capsule, Take 400 Units by mouth once daily., Disp: , Rfl:     zinc gluconate 50 mg tablet, Take 50 mg by mouth once daily., Disp: , Rfl:     Review of Systems   Constitutional: Negative for chills, decreased appetite, diaphoresis, fever, malaise/fatigue and night sweats.   HENT:  Negative for congestion and nosebleeds.    Eyes:  Negative for blurred vision and redness.   Cardiovascular:  Negative for chest pain (OCC TICKLE), claudication, cyanosis, dyspnea on exertion, irregular heartbeat, leg swelling, near-syncope, orthopnea, palpitations, paroxysmal nocturnal dyspnea and syncope.   Endocrine: Negative for polyuria.   Hematologic/Lymphatic: Negative for adenopathy. Does not bruise/bleed easily.   Skin:  Negative for color change and rash.   Musculoskeletal:  Negative for back pain (/ SCIATICA) and falls.   Gastrointestinal:  Negative for abdominal pain, dysphagia, heartburn, jaundice, melena and nausea.   Genitourinary:  Negative for dysuria and flank pain.   Neurological:  Negative for brief paralysis, dizziness, focal weakness, headaches, light-headedness, loss of balance and weakness.   Psychiatric/Behavioral:  Negative for altered mental status and depression.         Objective:      Vitals:    08/03/23 1403   BP: 130/66   Pulse: 67   Weight: 98 kg (216 lb 0.8 oz)   Height: 5' 11" (1.803 m) "   PainSc: 0-No pain     Body mass index is 30.13 kg/m².    Physical Exam  Constitutional:       Appearance: Normal appearance.   HENT:      Head: Normocephalic and atraumatic.   Eyes:      Extraocular Movements: Extraocular movements intact.      Conjunctiva/sclera: Conjunctivae normal.   Cardiovascular:      Rate and Rhythm: Normal rate and regular rhythm. No extrasystoles are present.     Pulses:           Carotid pulses are 2+ on the right side and 2+ on the left side.       Radial pulses are 2+ on the left side.      Heart sounds: Murmur heard.      Diastolic murmur is present.      No friction rub. No gallop.   Pulmonary:      Effort: Pulmonary effort is normal.      Breath sounds: Normal breath sounds and air entry.   Abdominal:      Palpations: Abdomen is soft.      Tenderness: There is no abdominal tenderness.   Musculoskeletal:      Right lower leg: No edema.      Comments: VARICOSE VEINS R > L   Skin:     General: Skin is warm and dry.      Capillary Refill: Capillary refill takes less than 2 seconds.   Neurological:      General: No focal deficit present.      Mental Status: He is alert and oriented to person, place, and time.   Psychiatric:         Mood and Affect: Mood normal.         Behavior: Behavior normal.                 ..    Chemistry        Component Value Date/Time     09/01/2022 0847    K 3.7 09/01/2022 0847     09/01/2022 0847    CO2 25 09/01/2022 0847    BUN 13 09/01/2022 0847    CREATININE 0.77 09/01/2022 0847    GLU 87 09/01/2022 0847        Component Value Date/Time    CALCIUM 7.7 (L) 09/01/2022 0847    ALKPHOS 47 09/01/2022 0847    AST 55 09/01/2022 0847    ALT 19 09/01/2022 0847    BILITOT 1.3 09/01/2022 0847            ..  Lab Results   Component Value Date    CHOL 110 05/08/2023    CHOL 109 09/09/2022    CHOL 116 06/18/2021     Lab Results   Component Value Date    HDL 48 05/08/2023    HDL 42 09/09/2022    HDL 48 06/18/2021     Lab Results   Component Value Date     "LDLCALC 50 05/08/2023    LDLCALC 57 09/09/2022    LDLCALC 56 06/18/2021     Lab Results   Component Value Date    TRIG 53 05/08/2023    TRIG 35 09/09/2022    TRIG 48 06/18/2021     No results found for: "CHOLHDL"  ..  Lab Results   Component Value Date    WBC 5.6 05/08/2023    HGB 14.4 05/08/2023    HCT 43.0 05/08/2023    MCV 87 09/01/2022     05/08/2023       Test(s) Reviewed  I have reviewed the following in detail:  [x] Stress test   [] Angiography   [x] Echocardiogram   [] Labs   [x] Other:       Assessment:         ICD-10-CM ICD-9-CM   1. Nonrheumatic aortic valve insufficiency  I35.1 424.1   2. Carotid artery plaque, bilateral  I65.23 433.10     433.30   3. Mild ascending aorta dilatation  I77.810 447.71   4. Aortic atherosclerosis  I70.0 440.0   5. Primary hypertension  I10 401.9   6. Coronary artery disease involving native coronary artery of native heart without angina pectoris  I25.10 414.01   7. Obesity, Class I, BMI 30-34.9  E66.9 278.00   8. Pure hypercholesterolemia  E78.00 272.0     Problem List Items Addressed This Visit          Cardiac/Vascular    Coronary artery disease    Relevant Orders    Comprehensive Metabolic Panel    Hyperlipemia    Relevant Orders    Comprehensive Metabolic Panel    Hypertension    Relevant Orders    Comprehensive Metabolic Panel    Aortic atherosclerosis    Nonrheumatic aortic valve insufficiency - Primary    Carotid artery plaque, bilateral    Mild ascending aorta dilatation       Endocrine    Obesity, Class I, BMI 30-34.9        Plan:     ALL CV CLINICALLY STABLE, NO ANGINA, NO HF, NO TIA, NO CLINICAL ARRHYTHMIA,CONTINUE CURRENT MEDS, EDUCATION, DIET, EXERCISE , RTC IN 6 MO WITH LABS, DISCUSSED PLAN WITH THE PATIENT AND HIS WIFE, ALL QUESTIONS ANSWERED,       Nonrheumatic aortic valve insufficiency    Carotid artery plaque, bilateral    Mild ascending aorta dilatation  Comments:  BB    Aortic atherosclerosis  Comments:  NO EMBOLIZATION    Primary " hypertension  Comments:  CONTROLLED  Orders:  -     Comprehensive Metabolic Panel; Future; Expected date: 02/03/2024    Coronary artery disease involving native coronary artery of native heart without angina pectoris  -     Comprehensive Metabolic Panel; Future; Expected date: 02/03/2024    Obesity, Class I, BMI 30-34.9    Pure hypercholesterolemia  -     Comprehensive Metabolic Panel; Future; Expected date: 02/03/2024    RTC Low level/low impact aerobic exercise 5x's/wk. Heart healthy diet and risk factor modification.    See labs and med orders.    Aerobic exercise 5x's/wk. Heart healthy diet and risk factor modification.    See labs and med orders.

## 2024-01-26 ENCOUNTER — PATIENT MESSAGE (OUTPATIENT)
Dept: CARDIOLOGY | Facility: CLINIC | Age: 79
End: 2024-01-26
Payer: MEDICARE

## 2024-01-26 DIAGNOSIS — E78.00 PURE HYPERCHOLESTEROLEMIA: Primary | ICD-10-CM

## 2024-01-26 NOTE — TELEPHONE ENCOUNTER
Spoke with pt. Pt sts that they will get CMP and Lipid panel blood work done at [facility]. Pt verbalized understanding of getting blood work done prior to 2/8/24 appt with Dr. Corrales.

## 2024-02-03 LAB
ALBUMIN SERPL-MCNC: 4 G/DL (ref 3.8–4.8)
ALBUMIN/GLOB SERPL: 1.8 {RATIO} (ref 1.2–2.2)
ALP SERPL-CCNC: 62 IU/L (ref 44–121)
ALT SERPL-CCNC: 21 IU/L (ref 0–44)
AST SERPL-CCNC: 25 IU/L (ref 0–40)
BASOPHILS # BLD AUTO: 0.1 X10E3/UL (ref 0–0.2)
BASOPHILS NFR BLD AUTO: 1 %
BILIRUB SERPL-MCNC: 0.8 MG/DL (ref 0–1.2)
BUN SERPL-MCNC: 15 MG/DL (ref 8–27)
BUN/CREAT SERPL: 16 (ref 10–24)
CALCIUM SERPL-MCNC: 9.4 MG/DL (ref 8.6–10.2)
CHLORIDE SERPL-SCNC: 100 MMOL/L (ref 96–106)
CHOLEST SERPL-MCNC: 116 MG/DL (ref 100–199)
CO2 SERPL-SCNC: 24 MMOL/L (ref 20–29)
CREAT SERPL-MCNC: 0.93 MG/DL (ref 0.76–1.27)
EOSINOPHIL # BLD AUTO: 0.2 X10E3/UL (ref 0–0.4)
EOSINOPHIL NFR BLD AUTO: 2 %
ERYTHROCYTE [DISTWIDTH] IN BLOOD BY AUTOMATED COUNT: 12.4 % (ref 11.6–15.4)
EST. GFR  (NO RACE VARIABLE): 84 ML/MIN/1.73
GLOBULIN SER CALC-MCNC: 2.2 G/DL (ref 1.5–4.5)
GLUCOSE SERPL-MCNC: 80 MG/DL (ref 70–99)
HCT VFR BLD AUTO: 44.7 % (ref 37.5–51)
HDLC SERPL-MCNC: 41 MG/DL
HGB BLD-MCNC: 15.1 G/DL (ref 13–17.7)
IMM GRANULOCYTES # BLD AUTO: 0 X10E3/UL (ref 0–0.1)
IMM GRANULOCYTES NFR BLD AUTO: 0 %
LDLC SERPL CALC-MCNC: 61 MG/DL (ref 0–99)
LYMPHOCYTES # BLD AUTO: 1.6 X10E3/UL (ref 0.7–3.1)
LYMPHOCYTES NFR BLD AUTO: 17 %
MCH RBC QN AUTO: 30.6 PG (ref 26.6–33)
MCHC RBC AUTO-ENTMCNC: 33.8 G/DL (ref 31.5–35.7)
MCV RBC AUTO: 91 FL (ref 79–97)
MONOCYTES # BLD AUTO: 0.6 X10E3/UL (ref 0.1–0.9)
MONOCYTES NFR BLD AUTO: 6 %
NEUTROPHILS # BLD AUTO: 6.9 X10E3/UL (ref 1.4–7)
NEUTROPHILS NFR BLD AUTO: 74 %
PLATELET # BLD AUTO: 216 X10E3/UL (ref 150–450)
POTASSIUM SERPL-SCNC: 4.3 MMOL/L (ref 3.5–5.2)
PROT SERPL-MCNC: 6.2 G/DL (ref 6–8.5)
PSA SERPL-MCNC: 1.5 NG/ML (ref 0–4)
RBC # BLD AUTO: 4.94 X10E6/UL (ref 4.14–5.8)
SODIUM SERPL-SCNC: 139 MMOL/L (ref 134–144)
TRIGL SERPL-MCNC: 69 MG/DL (ref 0–149)
VLDLC SERPL CALC-MCNC: 14 MG/DL (ref 5–40)
WBC # BLD AUTO: 9.3 X10E3/UL (ref 3.4–10.8)

## 2024-02-08 ENCOUNTER — TELEPHONE (OUTPATIENT)
Dept: CARDIOLOGY | Facility: CLINIC | Age: 79
End: 2024-02-08
Payer: MEDICARE

## 2024-02-08 ENCOUNTER — OFFICE VISIT (OUTPATIENT)
Dept: CARDIOLOGY | Facility: CLINIC | Age: 79
End: 2024-02-08
Payer: MEDICARE

## 2024-02-08 VITALS
DIASTOLIC BLOOD PRESSURE: 73 MMHG | BODY MASS INDEX: 26.76 KG/M2 | HEIGHT: 72 IN | HEART RATE: 64 BPM | SYSTOLIC BLOOD PRESSURE: 136 MMHG | WEIGHT: 197.56 LBS

## 2024-02-08 DIAGNOSIS — Z79.02 LONG TERM (CURRENT) USE OF ANTITHROMBOTICS/ANTIPLATELETS: ICD-10-CM

## 2024-02-08 DIAGNOSIS — E66.3 OVERWEIGHT (BMI 25.0-29.9): ICD-10-CM

## 2024-02-08 DIAGNOSIS — I70.0 AORTIC ATHEROSCLEROSIS: Chronic | ICD-10-CM

## 2024-02-08 DIAGNOSIS — I77.810 MILD ASCENDING AORTA DILATATION: ICD-10-CM

## 2024-02-08 DIAGNOSIS — I25.10 CORONARY ARTERY DISEASE INVOLVING NATIVE CORONARY ARTERY OF NATIVE HEART WITHOUT ANGINA PECTORIS: Primary | Chronic | ICD-10-CM

## 2024-02-08 DIAGNOSIS — I65.23 CAROTID ARTERY PLAQUE, BILATERAL: ICD-10-CM

## 2024-02-08 DIAGNOSIS — I10 PRIMARY HYPERTENSION: Chronic | ICD-10-CM

## 2024-02-08 DIAGNOSIS — E78.00 PURE HYPERCHOLESTEROLEMIA: Chronic | ICD-10-CM

## 2024-02-08 PROCEDURE — 99213 OFFICE O/P EST LOW 20 MIN: CPT | Mod: PBBFAC,PO | Performed by: INTERNAL MEDICINE

## 2024-02-08 PROCEDURE — 99999 PR PBB SHADOW E&M-EST. PATIENT-LVL III: CPT | Mod: PBBFAC,,, | Performed by: INTERNAL MEDICINE

## 2024-02-08 PROCEDURE — 99214 OFFICE O/P EST MOD 30 MIN: CPT | Mod: S$PBB,,, | Performed by: INTERNAL MEDICINE

## 2024-02-08 NOTE — PROGRESS NOTES
Subjective:    Patient ID:  Elias Ventura is a 78 y.o. male who presents for Follow-up and Results        HPI  DISCUSSED LABS AND GOALS CMP CBC NORMAL, PSA NORMAL REQUESTED PER PATIENT, LDL 61, HDL 41, TRIGLYCERIDE 69, DOING OK,OCC CHEST WALL SORENESS,  SEE ROS    Past Medical History:   Diagnosis Date    Coronary artery disease     Hyperlipemia     Hypertension      Past Surgical History:   Procedure Laterality Date    LEFT HEART CATHETERIZATION Left 9/1/2022    Procedure: Left heart cath;  Surgeon: Kiko Alonzo MD;  Location: [facility] CATH;  Service: Cardiovascular;  Laterality: Left;     No family history on file.  Social History     Socioeconomic History    Marital status:    Tobacco Use    Smoking status: Never    Smokeless tobacco: Never   Substance and Sexual Activity    Alcohol use: Not Currently    Drug use: Never       Review of patient's allergies indicates:  No Known Allergies    Current Outpatient Medications:     aspirin (ECOTRIN) 81 MG EC tablet, Take 81 mg by mouth once daily., Disp: , Rfl:     atorvastatin (LIPITOR) 80 MG tablet, Take 40 mg by mouth once daily., Disp: , Rfl:     cholecalciferol, vitamin D3, 125 mcg (5,000 unit) capsule, Take 5,000 Units by mouth once daily., Disp: , Rfl:     clopidogreL (PLAVIX) 75 mg tablet, Take 1 tablet by mouth once daily., Disp: , Rfl:     coenzyme Q10 400 mg Cap, Take 400 mg by mouth once daily., Disp: , Rfl:     glucosamine/chondr acharya A sod (OSTEO BI-FLEX ORAL), Take 1 capsule by mouth once daily., Disp: , Rfl:     grape seed extract 150 mg TbSR, Take 3 capsules by mouth once daily., Disp: , Rfl:     Lactobacillus rhamnosus GG (CULTURELLE) 10 billion cell capsule, Take 1 capsule by mouth once daily., Disp: , Rfl:     lutein 20 mg Cap, Take 1 capsule by mouth once daily., Disp: , Rfl:     metoprolol tartrate (LOPRESSOR) 50 MG tablet, Take 1 tablet by mouth 2 (two) times daily., Disp: , Rfl:     mv-min/vit C/glut/lysine/hb124 (IMMUNE SUPPORT  ORAL), Take 1 tablet by mouth once daily., Disp: , Rfl:     omega-3/dha/epa/fish oil (FISH OIL HIGH POTENCY ORAL), Take 1 capsule by mouth once daily., Disp: , Rfl:     ramipriL (ALTACE) 5 MG capsule, Take 1 capsule by mouth once daily., Disp: , Rfl:     resveratroL 100 mg Cap, Take by mouth., Disp: , Rfl:     saw palmetto 450 mg Cap, Take 1 capsule by mouth once daily., Disp: , Rfl:     tamsulosin (FLOMAX) 0.4 mg Cap, Take 1 capsule by mouth once daily., Disp: , Rfl:     vitamin E 400 UNIT capsule, Take 400 Units by mouth once daily., Disp: , Rfl:     zinc gluconate 50 mg tablet, Take 50 mg by mouth once daily., Disp: , Rfl:     Review of Systems   Constitutional: Negative for chills, decreased appetite, diaphoresis, fever, malaise/fatigue and night sweats. Weight loss: DIET.  HENT:  Negative for congestion and nosebleeds.    Eyes:  Negative for blurred vision and visual disturbance.   Cardiovascular:  Negative for chest pain (RARE CHEST WALL), claudication, cyanosis, dyspnea on exertion, irregular heartbeat, leg swelling, near-syncope, orthopnea, palpitations, paroxysmal nocturnal dyspnea and syncope.   Respiratory:  Negative for cough, hemoptysis and shortness of breath.    Endocrine: Negative for polyuria.   Hematologic/Lymphatic: Negative for adenopathy. Does not bruise/bleed easily.   Skin:  Negative for color change and rash.   Musculoskeletal:  Negative for falls, joint swelling and muscle cramps.   Gastrointestinal:  Negative for abdominal pain, dysphagia, heartburn, jaundice, melena and nausea.   Genitourinary:  Negative for dysuria and flank pain.   Neurological:  Negative for brief paralysis, dizziness, focal weakness, headaches, light-headedness, loss of balance, paresthesias and weakness.   Psychiatric/Behavioral:  Negative for altered mental status and depression.    Allergic/Immunologic: Negative for persistent infections.        Objective:      Vitals:    02/08/24 1615   BP: 136/73   Pulse: 64    Weight: 89.6 kg (197 lb 8.5 oz)   Height: 6' (1.829 m)   PainSc: 0-No pain     Body mass index is 26.79 kg/m².    Physical Exam  Constitutional:       Appearance: Normal appearance.   HENT:      Head: Normocephalic and atraumatic.   Eyes:      Extraocular Movements: Extraocular movements intact.      Conjunctiva/sclera: Conjunctivae normal.   Neck:      Vascular: Normal carotid pulses. No JVD.   Cardiovascular:      Rate and Rhythm: Normal rate and regular rhythm. No extrasystoles are present.     Pulses:           Carotid pulses are 2+ on the right side and 2+ on the left side.       Radial pulses are 2+ on the right side and 2+ on the left side.        Posterior tibial pulses are 2+ on the right side and 2+ on the left side.      Heart sounds: Murmur heard.      Diastolic murmur is present with a grade of 1/4.      No friction rub. No gallop.   Pulmonary:      Effort: Pulmonary effort is normal.      Breath sounds: Normal breath sounds. No rales.   Abdominal:      Palpations: Abdomen is soft.      Tenderness: There is no abdominal tenderness.   Musculoskeletal:      Cervical back: Neck supple.      Right lower leg: No edema.      Comments: VARICOSE VEINS R > L   Skin:     General: Skin is warm and dry.      Capillary Refill: Capillary refill takes less than 2 seconds.   Neurological:      General: No focal deficit present.      Mental Status: He is alert and oriented to person, place, and time.      Cranial Nerves: Cranial nerve deficit: [place].   Psychiatric:         Mood and Affect: Mood normal.         Speech: Speech normal.         Behavior: Behavior normal.                 ..    Chemistry        Component Value Date/Time     02/02/2024 1257    K 4.3 02/02/2024 1257     02/02/2024 1257    CO2 24 02/02/2024 1257    BUN 15 02/02/2024 1257    CREATININE 0.93 02/02/2024 1257    GLU 80 02/02/2024 1257        Component Value Date/Time    CALCIUM 9.4 02/02/2024 1257    ALKPHOS 47 09/01/2022 0847    AST 25  "02/02/2024 1257    ALT 21 02/02/2024 1257    BILITOT 0.8 02/02/2024 1257            ..  Lab Results   Component Value Date    CHOL 116 02/02/2024    CHOL 110 05/08/2023    CHOL 109 09/09/2022     Lab Results   Component Value Date    HDL 41 02/02/2024    HDL 48 05/08/2023    HDL 42 09/09/2022     Lab Results   Component Value Date    LDLCALC 61 02/02/2024    LDLCALC 50 05/08/2023    LDLCALC 57 09/09/2022     Lab Results   Component Value Date    TRIG 69 02/02/2024    TRIG 53 05/08/2023    TRIG 35 09/09/2022     No results found for: "CHOLHDL"  ..  Lab Results   Component Value Date    WBC 9.3 02/02/2024    HGB 15.1 02/02/2024    HCT 44.7 02/02/2024    MCV 91 02/02/2024     02/02/2024       Test(s) Reviewed  I have reviewed the following in detail:  [] Stress test   [] Angiography   [] Echocardiogram   [x] Labs   [] Other:       Assessment:         ICD-10-CM ICD-9-CM   1. Coronary artery disease involving native coronary artery of native heart without angina pectoris  I25.10 414.01   2. Aortic atherosclerosis  I70.0 440.0   3. Carotid artery plaque, bilateral  I65.23 433.10     433.30   4. Primary hypertension  I10 401.9   5. Pure hypercholesterolemia  E78.00 272.0   6. Long term (current) use of antithrombotics/antiplatelets  Z79.02 V58.63   7. Overweight (BMI 25.0-29.9)  E66.3 278.02   8. Mild ascending aorta dilatation  I77.810 447.71     Problem List Items Addressed This Visit          Cardiac/Vascular    Coronary artery disease - Primary    Relevant Orders    Comprehensive Metabolic Panel    Hemoglobin    Hyperlipemia    Relevant Orders    Comprehensive Metabolic Panel    Hypertension    Relevant Orders    Comprehensive Metabolic Panel    Aortic atherosclerosis    Carotid artery plaque, bilateral    Mild ascending aorta dilatation       Hematology    Long term (current) use of antithrombotics/antiplatelets    Relevant Orders    Comprehensive Metabolic Panel    Hemoglobin       Endocrine    Overweight (BMI " 25.0-29.9)        Plan:         ALL CV CLINICALLY STABLE, NO ANGINA, NO HF, NO TIA, NO CLINICAL ARRHYTHMIA,CONTINUE CURRENT MEDS, EDUCATION, DIET, EXERCISE , RTC IN 9 MO WITH LABS, DISCUSSED PLAN WITH THE PATIENT AND HIS WIFE  Coronary artery disease involving native coronary artery of native heart without angina pectoris  -     Comprehensive Metabolic Panel  -     Comprehensive Metabolic Panel; Future; Expected date: 08/08/2024  -     Hemoglobin; Future; Expected date: 08/08/2024    Aortic atherosclerosis  Comments:  NO CLAUDICATION    Carotid artery plaque, bilateral    Primary hypertension  Comments:  CONTROLLED  Orders:  -     Comprehensive Metabolic Panel  -     Comprehensive Metabolic Panel; Future; Expected date: 08/08/2024    Pure hypercholesterolemia  -     Comprehensive Metabolic Panel  -     Comprehensive Metabolic Panel; Future; Expected date: 08/08/2024    Long term (current) use of antithrombotics/antiplatelets  -     Comprehensive Metabolic Panel; Future; Expected date: 08/08/2024  -     Hemoglobin; Future; Expected date: 08/08/2024    Overweight (BMI 25.0-29.9)    Mild ascending aorta dilatation    RTC Low level/low impact aerobic exercise 5x's/wk. Heart healthy diet and risk factor modification.    See labs and med orders.    Aerobic exercise 5x's/wk. Heart healthy diet and risk factor modification.    See labs and med orders.

## 2024-02-08 NOTE — TELEPHONE ENCOUNTER
----- Message from Madonna Bowens sent at 2/8/2024  3:52 PM CST -----  Needs advice from nurse:      Who Called:pt  Regarding:pt running late//will be there at 4:00  Would the patient rather a call back or [facility]IA [facility]?  Best Call Back number: [phone]  Additional Info:

## 2024-10-08 ENCOUNTER — CLINICAL SUPPORT (OUTPATIENT)
Dept: AUDIOLOGY | Facility: CLINIC | Age: 79
End: 2024-10-08
Payer: MEDICARE

## 2024-10-08 ENCOUNTER — OFFICE VISIT (OUTPATIENT)
Dept: OTOLARYNGOLOGY | Facility: CLINIC | Age: 79
End: 2024-10-08
Payer: MEDICARE

## 2024-10-08 VITALS — BODY MASS INDEX: 26.76 KG/M2 | HEIGHT: 72 IN | WEIGHT: 197.56 LBS

## 2024-10-08 DIAGNOSIS — H90.3 BILATERAL SENSORINEURAL HEARING LOSS: Primary | ICD-10-CM

## 2024-10-08 DIAGNOSIS — R09.82 PND (POST-NASAL DRIP): ICD-10-CM

## 2024-10-08 DIAGNOSIS — R26.89 IMBALANCE: ICD-10-CM

## 2024-10-08 DIAGNOSIS — H93.13 TINNITUS, BILATERAL: ICD-10-CM

## 2024-10-08 DIAGNOSIS — H90.3 SENSORINEURAL HEARING LOSS (SNHL) OF BOTH EARS: Primary | ICD-10-CM

## 2024-10-08 DIAGNOSIS — R09.A2 GLOBUS SENSATION: ICD-10-CM

## 2024-10-08 PROCEDURE — 99999 PR PBB SHADOW E&M-EST. PATIENT-LVL III: CPT | Mod: PBBFAC,,, | Performed by: STUDENT IN AN ORGANIZED HEALTH CARE EDUCATION/TRAINING PROGRAM

## 2024-10-08 PROCEDURE — 92567 TYMPANOMETRY: CPT | Mod: PBBFAC,PO | Performed by: AUDIOLOGIST-HEARING AID FITTER

## 2024-10-08 PROCEDURE — 99204 OFFICE O/P NEW MOD 45 MIN: CPT | Mod: S$PBB,,, | Performed by: STUDENT IN AN ORGANIZED HEALTH CARE EDUCATION/TRAINING PROGRAM

## 2024-10-08 PROCEDURE — 92557 COMPREHENSIVE HEARING TEST: CPT | Mod: PBBFAC,PO | Performed by: AUDIOLOGIST-HEARING AID FITTER

## 2024-10-08 PROCEDURE — 99213 OFFICE O/P EST LOW 20 MIN: CPT | Mod: PBBFAC,PO | Performed by: STUDENT IN AN ORGANIZED HEALTH CARE EDUCATION/TRAINING PROGRAM

## 2024-10-08 RX ORDER — AZELASTINE 1 MG/ML
1 SPRAY, METERED NASAL 2 TIMES DAILY
Qty: 30 ML | Refills: 11 | Status: SHIPPED | OUTPATIENT
Start: 2024-10-08 | End: 2025-10-08

## 2024-10-08 NOTE — PROGRESS NOTES
Otolaryngology Clinic Note    Subjective:       Patient ID: Elias Ventura is a 78 y.o. male.    Chief Complaint: Hearing Loss (Hearing checked /), Hoarse, and Sinus Problem (Post nasal drip /)      History of Present Illness: Elias Ventura is a 78 y.o. male presenting with hearing loss.   Got sick Jan-March. Thinks maybe RSV from grankids, lots of talking. Got over it, in April noticed that he wakes up congested in am in throat/chest in am. Feels it now. No PND. No reflux.not in chest, in throat. Also developed imbalance. Happens in shower, sometimes closing eyes. Just lasts seconds. Walked yesterday and fell. Broke fall with left arm and hit shoulder. Was in a ditch, not sure if imbalance was related. No numbness in his feet. Has had room spinning once or twice since april. No vision changes. Nothing when laying in bed, nothing with head motions.   Had MI 2022. Ship yard and  service hx.       Past Surgical History:   Procedure Laterality Date    LEFT HEART CATHETERIZATION Left 9/1/2022    Procedure: Left heart cath;  Surgeon: Kiko Alonzo MD;  Location: [facility] CATH;  Service: Cardiovascular;  Laterality: Left;     Past Medical History:   Diagnosis Date    Coronary artery disease     Hyperlipemia     Hypertension      Social Drivers of Health     Tobacco Use: Medium Risk (8/14/2024)    Received from [facility] and Its Subsidiaries and Affiliates    Patient History     Smoking Tobacco Use: Former     Smokeless Tobacco Use: Never     Passive Exposure: Not on file   Alcohol Use: Patient Declined (10/1/2024)    AUDIT-C     Frequency of Alcohol Consumption: Patient declined     Average Number of Drinks: Patient declined     Frequency of Binge Drinking: Patient declined   Financial Resource Strain: Patient Declined (10/1/2024)    Overall Financial Resource Strain (CARDIA)     Difficulty of Paying Living Expenses: Patient declined   Food Insecurity: Patient  Declined (10/1/2024)    Hunger Vital Sign     Worried About Running Out of Food in the Last Year: Patient declined     Ran Out of Food in the Last Year: Patient declined   Transportation Needs: Not on file   Physical Activity: Sufficiently Active (10/1/2024)    Exercise Vital Sign     Days of Exercise per Week: 3 days     Minutes of Exercise per Session: 50 min   Stress: No Stress Concern Present (10/1/2024)    [place] [place] [employer] - Occupational Stress Questionnaire     Feeling of Stress : Not at all   Housing Stability: Unknown (10/1/2024)    Housing Stability Vital Sign     Unable to Pay for Housing in the Last Year: Patient declined     Homeless in the Last Year: Not on file   Depression: Not at risk (4/17/2024)    Received from [facility] Subsidiaries and Affiliates    PHQ-2     PHQ-2 Score: 0   Utilities: Patient Declined (10/1/2024)    [facility] Utilities     Threatened with loss of utilities: Patient declined   Health Literacy: Adequate Health Literacy (10/1/2024)     Health Literacy     Frequency of need for help with medical instructions: Never   Social Isolation: Not on file     Review of patient's allergies indicates:  No Known Allergies  Current Outpatient Medications   Medication Instructions    aspirin (ECOTRIN) 81 mg, Daily    atorvastatin (LIPITOR) 40 mg, Daily    cholecalciferol (vitamin D3) 5,000 Units, Daily    clopidogreL (PLAVIX) 75 mg tablet 1 tablet, Daily    coenzyme Q10 400 mg, Daily    glucosamine/chondr acharya A sod (OSTEO BI-FLEX ORAL) 1 capsule, Daily    grape seed extract 150 mg TbSR 3 capsules, Daily    Lactobacillus rhamnosus GG (CULTURELLE) 10 billion cell capsule 1 capsule, Daily    lutein 20 mg Cap 1 capsule, Daily    metoprolol tartrate (LOPRESSOR) 50 MG tablet 1 tablet, 2 times daily    mv-min/vit C/glut/lysine/hb124 (IMMUNE SUPPORT ORAL) 1 tablet, Daily    omega-3/dha/epa/fish oil (FISH OIL HIGH POTENCY ORAL) 1  capsule, Daily    ramipriL (ALTACE) 5 MG capsule 1 capsule, Daily    resveratroL 100 mg Cap Take by mouth.    saw palmetto 450 mg Cap 1 capsule, Daily    tamsulosin (FLOMAX) 0.4 mg Cap 1 capsule, Daily    vitamin E 400 Units, Daily    zinc gluconate 50 mg, Daily         ENT ROS negative except as stated above.     Patient answers are not available for this visit.            Objective:      There were no vitals filed for this visit.    General: NAD, well appearing  Eyes: Normal conjunctiva and lids  Face: symmetric, nerve intact  Nose: The nose is without any evidence of any deformity. The nasal mucosa is moist. The septum is midline. There is no evidence of septal hematoma. The turbinates are without abnormality.   Ears: The ears are with normal-appearing pinna. Examination of the canals is normal appearing bilaterally. There is no drainage or erythema noted. The tympanic membranes are normal appearing with pearly color, normal-appearing landmarks and normal light reflex. Hearing is grossly intact.  Mouth: No obvious abnormalities to the lips. The teeth are unremarkable. The gingivae are without any obvious evidence of infection or lesion. The oral mucosa is moist and pink. There are no obvious masses to the hard or soft palate.   Oropharynx: The uvula is midline.  The tongue is midline. The posterior pharynx has inflammation. The tonsils are normal appearing.  Salivary glands: The salivary glands are symmetric and not enlarged, no masses  Neck: No lymphadenopathy, trachea midline, thryoid not enlarged.  Psych: Normal mood and affect.   Neuro: Grossly intact  Speech: fluent    Test Review: I personally reviewed audiogram       Assessment and Plan:       1. Sensorineural hearing loss (SNHL) of both ears    2. PND (post-nasal drip)    3. Imbalance    4. Globus sensation          He is medically cleared for hearing aids. Reviewed test. He is a [place].he is hesitant to use [facility].    Recommend he try vestibular PT for new  balance complaints. [facility] referral sent.     Try astelin nasal spray twice a day for next 8 weeks. Could do short course steroids. Had medrol pack march and may.   Could do oral AH. Has helped him prior.     RTC: 2 month follow up throat/globus, scope if not better    Plan of care was discussed in detail with the patient, who agreed with the plan as above. All questions were answered in detail.     Blanca Kemp MD  Otolaryngology

## 2024-10-08 NOTE — PROGRESS NOTES
Elias Ventura was seen on 10/08/2024 for an audiological evaluation. Pt was accompanied by spouse during today's visit. Pertinent complaints today include hearing loss and occasional tinnitus AU. 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 Otoscopy revealed minimal cerumen in both ears. The tympanic membrane was visualized AU prior to proceeding with the hearing testing. He brought in 3 tests from other hearing care professionals but wanted a 4th opinion.     Results reveal a bilateral mild sloping to severe sensorineural hearing loss.    Speech Reception Thresholds were  30 dBHL for the right ear and 35 dBHL for the left ear.    Word recognition scores were good for the right ear and good for the left ear.   Tympanograms were Type A for the right ear and Type Ad for the left ear.    Audiogram results were reviewed in detail with patient and all questions were answered. Results will be reviewed by the referring provider at the completion of this note. All complaints were addressed during this visit to the patient's satisfaction. Recommend binaural amplification pending medical clearance, repeat hearing testing in one year due to noise exposure and bilateral hearing protection with either muffs or in-ear protection in loud noises. Plan of care was discussed in detail with the patient, who agreed with the plan as above.

## 2024-11-07 LAB
ALBUMIN SERPL-MCNC: 3.8 G/DL (ref 3.8–4.8)
ALP SERPL-CCNC: 60 IU/L (ref 44–121)
ALT SERPL-CCNC: 25 IU/L (ref 0–44)
AST SERPL-CCNC: 31 IU/L (ref 0–40)
BILIRUB SERPL-MCNC: 1 MG/DL (ref 0–1.2)
BUN SERPL-MCNC: 16 MG/DL (ref 8–27)
BUN/CREAT SERPL: 20 (ref 10–24)
CALCIUM SERPL-MCNC: 8.8 MG/DL (ref 8.6–10.2)
CHLORIDE SERPL-SCNC: 101 MMOL/L (ref 96–106)
CHOLEST SERPL-MCNC: 121 MG/DL (ref 100–199)
CO2 SERPL-SCNC: 24 MMOL/L (ref 20–29)
CREAT SERPL-MCNC: 0.8 MG/DL (ref 0.76–1.27)
EST. GFR  (NO RACE VARIABLE): 90 ML/MIN/1.73
GLOBULIN SER CALC-MCNC: 2 G/DL (ref 1.5–4.5)
GLUCOSE SERPL-MCNC: 71 MG/DL (ref 70–99)
HDLC SERPL-MCNC: 48 MG/DL
LDLC SERPL CALC-MCNC: 62 MG/DL (ref 0–99)
POTASSIUM SERPL-SCNC: 3.9 MMOL/L (ref 3.5–5.2)
PROT SERPL-MCNC: 5.8 G/DL (ref 6–8.5)
SODIUM SERPL-SCNC: 138 MMOL/L (ref 134–144)
TRIGL SERPL-MCNC: 44 MG/DL (ref 0–149)
VLDLC SERPL CALC-MCNC: 11 MG/DL (ref 5–40)

## 2024-11-12 ENCOUNTER — OFFICE VISIT (OUTPATIENT)
Dept: CARDIOLOGY | Facility: CLINIC | Age: 79
End: 2024-11-12
Payer: MEDICARE

## 2024-11-12 VITALS
BODY MASS INDEX: 27.23 KG/M2 | SYSTOLIC BLOOD PRESSURE: 136 MMHG | DIASTOLIC BLOOD PRESSURE: 62 MMHG | HEART RATE: 65 BPM | HEIGHT: 72 IN | WEIGHT: 201.06 LBS

## 2024-11-12 DIAGNOSIS — I10 PRIMARY HYPERTENSION: Chronic | ICD-10-CM

## 2024-11-12 DIAGNOSIS — I35.1 NONRHEUMATIC AORTIC VALVE INSUFFICIENCY: Chronic | ICD-10-CM

## 2024-11-12 DIAGNOSIS — I65.23 CAROTID ARTERY PLAQUE, BILATERAL: Chronic | ICD-10-CM

## 2024-11-12 DIAGNOSIS — E66.3 OVERWEIGHT (BMI 25.0-29.9): Chronic | ICD-10-CM

## 2024-11-12 DIAGNOSIS — Z79.02 LONG TERM (CURRENT) USE OF ANTITHROMBOTICS/ANTIPLATELETS: Chronic | ICD-10-CM

## 2024-11-12 DIAGNOSIS — I70.0 AORTIC ATHEROSCLEROSIS: Chronic | ICD-10-CM

## 2024-11-12 DIAGNOSIS — E78.49 OTHER HYPERLIPIDEMIA: Chronic | ICD-10-CM

## 2024-11-12 DIAGNOSIS — I25.10 CORONARY ARTERY DISEASE INVOLVING NATIVE CORONARY ARTERY OF NATIVE HEART WITHOUT ANGINA PECTORIS: Primary | Chronic | ICD-10-CM

## 2024-11-12 PROCEDURE — 99214 OFFICE O/P EST MOD 30 MIN: CPT | Mod: S$PBB,,, | Performed by: INTERNAL MEDICINE

## 2024-11-12 PROCEDURE — 99213 OFFICE O/P EST LOW 20 MIN: CPT | Mod: PBBFAC,PO | Performed by: INTERNAL MEDICINE

## 2024-11-12 PROCEDURE — 99999 PR PBB SHADOW E&M-EST. PATIENT-LVL III: CPT | Mod: PBBFAC,,, | Performed by: INTERNAL MEDICINE

## 2024-11-12 RX ORDER — ATORVASTATIN CALCIUM 40 MG/1
40 TABLET, FILM COATED ORAL NIGHTLY
Qty: 90 TABLET | Refills: 2 | Status: SHIPPED | OUTPATIENT
Start: 2024-11-12

## 2024-11-12 RX ORDER — RAMIPRIL 5 MG/1
5 CAPSULE ORAL DAILY
Qty: 90 CAPSULE | Refills: 2 | Status: SHIPPED | OUTPATIENT
Start: 2024-11-12

## 2024-11-12 RX ORDER — METOPROLOL TARTRATE 50 MG/1
50 TABLET ORAL 2 TIMES DAILY
Qty: 180 TABLET | Refills: 2 | Status: SHIPPED | OUTPATIENT
Start: 2024-11-12

## 2024-11-12 NOTE — PROGRESS NOTES
Subjective:    Patient ID:  Elias Ventura is a 79 y.o. male who presents for Coronary Artery Disease and Hypertension        HPI  DISCUSSED LABS AND GOALS, CMP OK, LDL 62 HDL 48, TRIGLYCERIDE 44, DOING OK, HAD RSV INFECTION, SOME HOARSNESS SINCE THEN, PT FOR BALANCE, WONDERING ABOUT STEM CELL TREATMENT, SEE ROS    Past Medical History:   Diagnosis Date    Coronary artery disease     Hyperlipemia     Hypertension      Past Surgical History:   Procedure Laterality Date    LEFT HEART CATHETERIZATION Left 9/1/2022    Procedure: Left heart cath;  Surgeon: Kiko Alonzo MD;  Location: [facility] CATH;  Service: Cardiovascular;  Laterality: Left;     No family history on file.  Social History     Socioeconomic History    Marital status:    Tobacco Use    Smoking status: Never    Smokeless tobacco: Never   Substance and Sexual Activity    Alcohol use: Not Currently    Drug use: Never     Social Drivers of Health     Financial Resource Strain: Patient Declined (10/1/2024)    Overall Financial Resource Strain (CARDIA)     Difficulty of Paying Living Expenses: Patient declined   Food Insecurity: Patient Declined (10/1/2024)    Hunger Vital Sign     Worried About Running Out of Food in the Last Year: Patient declined     Ran Out of Food in the Last Year: Patient declined   Physical Activity: Sufficiently Active (10/1/2024)    Exercise Vital Sign     Days of Exercise per Week: 3 days     Minutes of Exercise per Session: 50 min   Stress: No Stress Concern Present (10/1/2024)    [employer] [place] [employer] - Occupational Stress Questionnaire     Feeling of Stress : Not at all   Housing Stability: Unknown (10/1/2024)    Housing Stability Vital Sign     Unable to Pay for Housing in the Last Year: Patient declined       Review of patient's allergies indicates:  No Known Allergies    Current Outpatient Medications:     aspirin (ECOTRIN) 81 MG EC tablet, Take 81 mg by mouth once daily., Disp: , Rfl:      azelastine (ASTELIN) 137 mcg (0.1 %) nasal spray, 1 spray (137 mcg total) by Nasal route 2 (two) times daily., Disp: 30 mL, Rfl: 11    cholecalciferol, vitamin D3, 125 mcg (5,000 unit) capsule, Take 5,000 Units by mouth once daily., Disp: , Rfl:     clopidogreL (PLAVIX) 75 mg tablet, Take 1 tablet by mouth once daily., Disp: , Rfl:     coenzyme Q10 400 mg Cap, Take 400 mg by mouth once daily., Disp: , Rfl:     glucosamine/chondr acharya A sod (OSTEO BI-FLEX ORAL), Take 1 capsule by mouth once daily., Disp: , Rfl:     grape seed extract 150 mg TbSR, Take 3 capsules by mouth once daily., Disp: , Rfl:     Lactobacillus rhamnosus GG (CULTURELLE) 10 billion cell capsule, Take 1 capsule by mouth once daily., Disp: , Rfl:     lutein 20 mg Cap, Take 1 capsule by mouth once daily., Disp: , Rfl:     mv-min/vit C/glut/lysine/hb124 (IMMUNE SUPPORT ORAL), Take 1 tablet by mouth once daily., Disp: , Rfl:     omega-3/dha/epa/fish oil (FISH OIL HIGH POTENCY ORAL), Take 1 capsule by mouth once daily., Disp: , Rfl:     resveratroL 100 mg Cap, Take by mouth., Disp: , Rfl:     saw palmetto 450 mg Cap, Take 1 capsule by mouth once daily., Disp: , Rfl:     tamsulosin (FLOMAX) 0.4 mg Cap, Take 1 capsule by mouth once daily., Disp: , Rfl:     vitamin E 400 UNIT capsule, Take 400 Units by mouth once daily., Disp: , Rfl:     zinc gluconate 50 mg tablet, Take 50 mg by mouth once daily., Disp: , Rfl:     atorvastatin (LIPITOR) 40 MG tablet, Take 1 tablet (40 mg total) by mouth every evening., Disp: 90 tablet, Rfl: 2    metoprolol tartrate (LOPRESSOR) 50 MG tablet, Take 1 tablet (50 mg total) by mouth 2 (two) times daily., Disp: 180 tablet, Rfl: 2    ramipriL (ALTACE) 5 MG capsule, Take 1 capsule (5 mg total) by mouth once daily., Disp: 90 capsule, Rfl: 2    Review of Systems   Constitutional: Negative for chills, decreased appetite, diaphoresis, fever, malaise/fatigue and night sweats.   HENT:  Negative for congestion and nosebleeds.    Eyes:   Negative for blurred vision and visual disturbance.   Cardiovascular:  Negative for chest pain, claudication, cyanosis, dyspnea on exertion, irregular heartbeat, leg swelling, near-syncope, orthopnea, palpitations, paroxysmal nocturnal dyspnea and syncope.   Respiratory:  Negative for cough, hemoptysis and shortness of breath.    Endocrine: Negative for polyuria.   Hematologic/Lymphatic: Negative for adenopathy. Does not bruise/bleed easily.   Skin:  Negative for color change and rash.   Musculoskeletal:  Negative for falls, joint swelling and muscle cramps.   Gastrointestinal:  Negative for abdominal pain, dysphagia, heartburn, jaundice, melena and nausea.   Genitourinary:  Negative for dysuria and flank pain.   Neurological:  Negative for brief paralysis, dizziness, focal weakness, headaches, light-headedness, loss of balance, numbness and weakness.   Psychiatric/Behavioral:  Negative for altered mental status and depression.    Allergic/Immunologic: Negative for persistent infections.        Objective:      Vitals:    11/12/24 1345 11/12/24 1402   BP: (!) 148/70 136/62   Pulse: 65    Weight: 91.2 kg (201 lb 1 oz)    Height: 6' (1.829 m)    PainSc: 0-No pain      Body mass index is 27.27 kg/m².    Physical Exam  Constitutional:       Appearance: Normal appearance.   HENT:      Head: Normocephalic and atraumatic.   Eyes:      Extraocular Movements: Extraocular movements intact.      Conjunctiva/sclera: Conjunctivae normal.   Neck:      Vascular: Normal carotid pulses. No JVD.   Cardiovascular:      Rate and Rhythm: Normal rate and regular rhythm. No extrasystoles are present.     Pulses:           Carotid pulses are 2+ on the right side and 2+ on the left side.       Radial pulses are 2+ on the right side and 2+ on the left side.        Posterior tibial pulses are 2+ on the right side and 2+ on the left side.      Heart sounds: Murmur heard.      Diastolic murmur is present with a grade of 1/4 at the lower left  "sternal border.      No friction rub. No gallop.   Pulmonary:      Effort: Pulmonary effort is normal.      Breath sounds: Normal breath sounds and air entry. No rales.   Abdominal:      Palpations: Abdomen is soft.      Tenderness: There is no abdominal tenderness.   Musculoskeletal:      Cervical back: Neck supple.      Right lower leg: No edema.      Comments: VARICOSE VEINS    Skin:     General: Skin is warm and dry.      Capillary Refill: Capillary refill takes less than 2 seconds.   Neurological:      General: No focal deficit present.      Mental Status: He is alert and oriented to person, place, and time.      Cranial Nerves: Cranial nerve deficit: Samish.   Psychiatric:         Mood and Affect: Mood normal.         Speech: Speech normal.         Behavior: Behavior normal.                 ..    Chemistry        Component Value Date/Time     11/06/2024 1356    K 3.9 11/06/2024 1356     11/06/2024 1356    CO2 24 11/06/2024 1356    BUN 16 11/06/2024 1356    CREATININE 0.80 11/06/2024 1356    GLU 71 11/06/2024 1356        Component Value Date/Time    CALCIUM 8.8 11/06/2024 1356    ALKPHOS 47 09/01/2022 0847    AST 31 11/06/2024 1356    ALT 25 11/06/2024 1356    BILITOT 1.0 11/06/2024 1356            ..  Lab Results   Component Value Date    CHOL 121 11/06/2024    CHOL 131 08/23/2024    CHOL 116 02/02/2024     Lab Results   Component Value Date    HDL 48 11/06/2024    HDL 56 08/23/2024    HDL 41 02/02/2024     Lab Results   Component Value Date    LDLCALC 62 11/06/2024    LDLCALC 61 08/23/2024    LDLCALC 61 02/02/2024     Lab Results   Component Value Date    TRIG 44 11/06/2024    TRIG 68 08/23/2024    TRIG 69 02/02/2024     No results found for: "CHOLHDL"  ..  Lab Results   Component Value Date    WBC 9.2 08/23/2024    HGB 15.9 08/23/2024    HCT 47.1 08/23/2024    MCV 91 02/02/2024     08/23/2024       Test(s) Reviewed  I have reviewed the following in detail:  [] Stress test   [] Angiography "   [] Echocardiogram   [x] Labs   [] Other:       Assessment:         ICD-10-CM ICD-9-CM   1. Coronary artery disease involving native coronary artery of native heart without angina pectoris  I25.10 414.01   2. Nonrheumatic aortic valve insufficiency  I35.1 424.1   3. Other hyperlipidemia  E78.49 272.4   4. Primary hypertension  I10 401.9   5. Long term (current) use of antithrombotics/antiplatelets  Z79.02 V58.63   6. Carotid artery plaque, bilateral  I65.23 433.10     433.30   7. Aortic atherosclerosis  I70.0 440.0   8. Overweight (BMI 25.0-29.9)  E66.3 278.02     Problem List Items Addressed This Visit          Cardiac/Vascular    Coronary artery disease - Primary    Relevant Orders    Comprehensive Metabolic Panel    Hyperlipemia    Relevant Orders    Comprehensive Metabolic Panel    Hypertension    Relevant Orders    Comprehensive Metabolic Panel    Magnesium    Aortic atherosclerosis    Nonrheumatic aortic valve insufficiency    Carotid artery plaque, bilateral       Hematology    Long term (current) use of antithrombotics/antiplatelets    Relevant Orders    Hemoglobin       Endocrine    Overweight (BMI 25.0-29.9)        Plan:       ALL CV CLINICALLY STABLE, NO ANGINA, NO HF, NO TIA, NO CLINICAL ARRHYTHMIA,CONTINUE CURRENT MEDS, EDUCATION, DIET, EXERCISE, RTC IN 9 MO WITH LABS      Coronary artery disease involving native coronary artery of native heart without angina pectoris  -     Comprehensive Metabolic Panel  -     Hemoglobin  -     Comprehensive Metabolic Panel; Future; Expected date: 11/12/2024    Nonrheumatic aortic valve insufficiency    Other hyperlipidemia  -     Comprehensive Metabolic Panel  -     Comprehensive Metabolic Panel; Future; Expected date: 11/12/2024    Primary hypertension  Comments:  CONTROLLED  Orders:  -     Comprehensive Metabolic Panel  -     Comprehensive Metabolic Panel; Future; Expected date: 11/12/2024  -     Magnesium; Future; Expected date: 11/12/2024    Long term (current)  use of antithrombotics/antiplatelets  -     Comprehensive Metabolic Panel  -     Hemoglobin  -     Hemoglobin; Future; Expected date: 05/12/2025    Carotid artery plaque, bilateral    Aortic atherosclerosis    Overweight (BMI 25.0-29.9)    Other orders  -     metoprolol tartrate (LOPRESSOR) 50 MG tablet; Take 1 tablet (50 mg total) by mouth 2 (two) times daily.  Dispense: 180 tablet; Refill: 2  -     atorvastatin (LIPITOR) 40 MG tablet; Take 1 tablet (40 mg total) by mouth every evening.  Dispense: 90 tablet; Refill: 2  -     ramipriL (ALTACE) 5 MG capsule; Take 1 capsule (5 mg total) by mouth once daily.  Dispense: 90 capsule; Refill: 2    RTC Low level/low impact aerobic exercise 5x's/wk. Heart healthy diet and risk factor modification.    See labs and med orders.    Aerobic exercise 5x's/wk. Heart healthy diet and risk factor modification.    See labs and med orders.      ALL CV CLINICALLY STABLE, NO ANGINA, NO HF, NO TIA, NO CLINICAL ARRHYTHMIA,CONTINUE CURRENT MEDS, EDUCATION, DIET, EXERCISE

## 2024-12-10 ENCOUNTER — PATIENT MESSAGE (OUTPATIENT)
Dept: CARDIOLOGY | Facility: CLINIC | Age: 79
End: 2024-12-10
Payer: MEDICARE

## 2024-12-12 ENCOUNTER — PATIENT MESSAGE (OUTPATIENT)
Dept: OTOLARYNGOLOGY | Facility: CLINIC | Age: 79
End: 2024-12-12

## 2024-12-12 ENCOUNTER — OFFICE VISIT (OUTPATIENT)
Dept: OTOLARYNGOLOGY | Facility: CLINIC | Age: 79
End: 2024-12-12
Payer: MEDICARE

## 2024-12-12 ENCOUNTER — OFFICE VISIT (OUTPATIENT)
Dept: CARDIOLOGY | Facility: CLINIC | Age: 79
End: 2024-12-12
Payer: MEDICARE

## 2024-12-12 VITALS
HEIGHT: 72 IN | WEIGHT: 194.44 LBS | HEART RATE: 62 BPM | DIASTOLIC BLOOD PRESSURE: 62 MMHG | SYSTOLIC BLOOD PRESSURE: 134 MMHG | BODY MASS INDEX: 26.34 KG/M2

## 2024-12-12 VITALS
BODY MASS INDEX: 26.34 KG/M2 | DIASTOLIC BLOOD PRESSURE: 61 MMHG | HEIGHT: 72 IN | WEIGHT: 194.44 LBS | SYSTOLIC BLOOD PRESSURE: 115 MMHG

## 2024-12-12 DIAGNOSIS — R09.82 PND (POST-NASAL DRIP): ICD-10-CM

## 2024-12-12 DIAGNOSIS — J06.9 UPPER RESPIRATORY TRACT INFECTION, UNSPECIFIED TYPE: ICD-10-CM

## 2024-12-12 DIAGNOSIS — R42 DIZZY: Primary | ICD-10-CM

## 2024-12-12 DIAGNOSIS — R53.83 OTHER FATIGUE: Primary | ICD-10-CM

## 2024-12-12 DIAGNOSIS — E66.3 OVERWEIGHT (BMI 25.0-29.9): Chronic | ICD-10-CM

## 2024-12-12 DIAGNOSIS — I25.10 CORONARY ARTERY DISEASE INVOLVING NATIVE CORONARY ARTERY OF NATIVE HEART WITHOUT ANGINA PECTORIS: Chronic | ICD-10-CM

## 2024-12-12 DIAGNOSIS — R26.89 IMBALANCE: ICD-10-CM

## 2024-12-12 DIAGNOSIS — R42 VERTIGO: ICD-10-CM

## 2024-12-12 PROCEDURE — 99213 OFFICE O/P EST LOW 20 MIN: CPT | Mod: PBBFAC,27,PO | Performed by: INTERNAL MEDICINE

## 2024-12-12 PROCEDURE — 99213 OFFICE O/P EST LOW 20 MIN: CPT | Mod: PBBFAC,PO | Performed by: STUDENT IN AN ORGANIZED HEALTH CARE EDUCATION/TRAINING PROGRAM

## 2024-12-12 PROCEDURE — 99213 OFFICE O/P EST LOW 20 MIN: CPT | Mod: S$PBB,,, | Performed by: INTERNAL MEDICINE

## 2024-12-12 PROCEDURE — 99214 OFFICE O/P EST MOD 30 MIN: CPT | Mod: S$PBB,,, | Performed by: STUDENT IN AN ORGANIZED HEALTH CARE EDUCATION/TRAINING PROGRAM

## 2024-12-12 PROCEDURE — 99999 PR PBB SHADOW E&M-EST. PATIENT-LVL III: CPT | Mod: PBBFAC,,, | Performed by: STUDENT IN AN ORGANIZED HEALTH CARE EDUCATION/TRAINING PROGRAM

## 2024-12-12 PROCEDURE — 99999 PR PBB SHADOW E&M-EST. PATIENT-LVL III: CPT | Mod: PBBFAC,,, | Performed by: INTERNAL MEDICINE

## 2024-12-12 NOTE — PROGRESS NOTES
Otolaryngology Clinic Note    Subjective:       Patient ID: Elias Ventura is a 79 y.o. male.    Chief Complaint: Follow-up, Sinus Problem, and Dizziness      History of Present Illness: Elias Ventura is a 79 y.o. male presenting with hearing loss.   Got sick Jan-March. Thinks maybe RSV from grankids, lots of talking. Got over it, in April noticed that he wakes up congested in am in throat/chest in am. Feels it now. No PND. No reflux.not in chest, in throat. Also developed imbalance. Happens in shower, sometimes closing eyes. Just lasts seconds. Walked yesterday and fell. Broke fall with left arm and hit shoulder. Was in a ditch, not sure if imbalance was related. No numbness in his feet. Has had room spinning once or twice since april. No vision changes. Nothing when laying in bed, nothing with head motions.   Had MI 2022. [employer] and  service hx.     12/12/24: here to recheck throat.   Started with a cold 9 days ago. Tech at [facility] sneezed all over him. Not sure if that was where he got it. Wednesday a week ago had fever 101, congestion, miserable, cranky. Started tylenol cold and sinus, , cetirizine, dayquil, zicam swabs and lozenges. Has rx cough medicine- promethazine DM. Did vitamin C, herbal tea, grean tea, lemon/lime mix. Lots of soup with garlic. Green lettuce. He is feeling better mostly just today. Had some colored phlegm but clearing.  Has been gargling salt water. Was doing ok prior. Is using astelin BID and is helping. Helpful for acute stuffiness. Bent over and got dizzy this morning. Still off balance in shower. He has been doing PT for this since last seen. Thinks PT helping but not perfect. Still issues with shower. Had concern for periapical abscess on left at dentist and was tx. Had bad dizzy spell at the dentist office. Happened with prior dental work as well. Had to say if it was spinning. Just had injection for this. BP ok today. Reports his BP is usually not as low as in clinic today.        Past Surgical History:   Procedure Laterality Date    LEFT HEART CATHETERIZATION Left 9/1/2022    Procedure: Left heart cath;  Surgeon: Kiko Alonzo MD;  Location: [facility] CATH;  Service: Cardiovascular;  Laterality: Left;     Past Medical History:   Diagnosis Date    Coronary artery disease     Hyperlipemia     Hypertension      Social Drivers of Health     Tobacco Use: Medium Risk (8/14/2024)    Received from [facility]sidi[facility]    Patient History     Smoking Tobacco Use: Former     Smokeless Tobacco Use: Never     Passive Exposure: Not on file   Alcohol Use: Patient Declined (10/1/2024)    AUDIT-C     Frequency of Alcohol Consumption: Patient declined     Average Number of Drinks: Patient declined     Frequency of Binge Drinking: Patient declined   Financial Resource Strain: Patient Declined (10/1/2024)    Overall Financial Resource Strain (CARDIA)     Difficulty of Paying Living Expenses: Patient declined   Food Insecurity: Patient Declined (10/1/2024)    Hunger Vital Sign     Worried About Running Out of Food in the Last Year: Patient declined     Ran Out of Food in the Last Year: Patient declined   Transportation Needs: Not on file   Physical Activity: Sufficiently Active (10/1/2024)    Exercise Vital Sign     Days of Exercise per Week: 3 days     Minutes of Exercise per Session: 50 min   Stress: No Stress Concern Present (10/1/2024)    [employer] [place] [employer] - Occupational Stress Questionnaire     Feeling of Stress : Not at all   Housing Stability: Unknown (10/1/2024)    Housing Stability Vital Sign     Unable to Pay for Housing in the Last Year: Patient declined     Homeless in the Last Year: Not on file   Depression: Not at risk (4/17/2024)    Received from [facility]    PHQ-2     PHQ-2 Score: 0   Utilities: Patient Declined  (10/1/2024)    [facility] Utilities     Threatened with loss of utilities: Patient declined   Health Literacy: Adequate Health Literacy (10/1/2024)     Health Literacy     Frequency of need for help with medical instructions: Never   Social Isolation: Not on file     Review of patient's allergies indicates:  No Known Allergies  Current Outpatient Medications   Medication Instructions    aspirin (ECOTRIN) 81 mg, Daily    atorvastatin (LIPITOR) 40 mg, Oral, Nightly    azelastine (ASTELIN) 137 mcg, Nasal, 2 times daily    cholecalciferol (vitamin D3) 5,000 Units, Daily    clopidogreL (PLAVIX) 75 mg tablet 1 tablet, Daily    coenzyme Q10 400 mg, Daily    glucosamine/chondr acharya A sod (OSTEO BI-FLEX ORAL) 1 capsule, Daily    grape seed extract 150 mg TbSR 3 capsules, Daily    Lactobacillus rhamnosus GG (CULTURELLE) 10 billion cell capsule 1 capsule, Daily    lutein 20 mg Cap 1 capsule, Daily    metoprolol tartrate (LOPRESSOR) 50 mg, Oral, 2 times daily    mv-min/vit C/glut/lysine/hb124 (IMMUNE SUPPORT ORAL) 1 tablet, Daily    omega-3/dha/epa/fish oil (FISH OIL HIGH POTENCY ORAL) 1 capsule, Daily    ramipriL (ALTACE) 5 mg, Oral, Daily    resveratroL 100 mg Cap Take by mouth.    saw palmetto 450 mg Cap 1 capsule, Daily    tamsulosin (FLOMAX) 0.4 mg Cap 1 capsule, Daily    vitamin E 400 Units, Daily    zinc gluconate 50 mg, Daily         ENT ROS negative except as stated above.     Patient answers are not available for this visit.            Objective:      Vitals:    12/12/24 1422   BP: 115/61       General: NAD, well appearing  Eyes: Normal conjunctiva and lids  Face: symmetric, nerve intact  Nose: The nose is without any evidence of any deformity. The nasal mucosa is inflamed with clear rhinorrhea noted on right. The septum is midline. There is no evidence of septal hematoma. The turbinates are without abnormality.   Ears: The ears are with normal-appearing pinna. Examination of the canals is normal appearing bilaterally.  There is no drainage or erythema noted. The tympanic membranes are normal appearing with pearly color, normal-appearing landmarks and normal light reflex. Hearing is grossly intact.  Mouth: No obvious abnormalities to the lips. The teeth are unremarkable. The gingivae are without any obvious evidence of infection or lesion. The oral mucosa is moist and pink. There are no obvious masses to the hard or soft palate.   Oropharynx: The uvula is midline.  The tongue is midline. The posterior pharynx has inflammation. The tonsils are normal appearing.  Salivary glands: The salivary glands are symmetric and not enlarged, no masses  Neck: No lymphadenopathy, trachea midline, thryoid not enlarged.  Psych: Normal mood and affect.   Neuro: Grossly intact  Speech: fluent  [place] hallpike and head thrust negative. Dizzy when sitting back up consistent with blood flow.     Test Review: I personally reviewed audiogram       Assessment and Plan:       1. Dizzy    2. Upper respiratory tract infection, unspecified type    3. Imbalance    4. PND (post-nasal drip)            He is medically cleared for hearing aids. Reviewed test. He is a [place].he is hesit[facility] to use [facility].    He is doing vestibular PT for balance complaints. [facility]. Is helping somewhat.     Weight went from 230 to 187 after RSV. 194 now. Takes BP meds at night. Dizziness today seems more blood flow. Hydrate more. Recommend check his BP at home as he may need less meds with weight loss. He will follow up with cardiology today on this. On 2 BP meds. Has been sick so may be worse not but was present before.     Recent URI improving. Using OTC meds. All ok. Reviewed his supplements with him.     Continue astelin for PND.     RTC: PRN with me    Plan of care was discussed in detail with the patient, who agreed with the plan as above. All questions were answered in detail.     Blanca Kemp MD  Otolaryngology

## 2024-12-12 NOTE — PROGRESS NOTES
Subjective:    Patient ID:  Elias Ventura is a 79 y.o. male who presents for Follow-up        HPI  REQUESTED OFFICE VISIT,FATIGUE , WIFE WANTED HIM TO BE CHECKED, HAD BAD COLD 10 DAYS AGO, GETTING BETTER, HAD FEVER /CHILLS, TOOK COLD AND FLU MEDS OTC, , ALSO ZICAM, SEE ROS    Past Medical History:   Diagnosis Date    Coronary artery disease     Hyperlipemia     Hypertension      Past Surgical History:   Procedure Laterality Date    LEFT HEART CATHETERIZATION Left 9/1/2022    Procedure: Left heart cath;  Surgeon: Kiko Alonzo MD;  Location: [facility] CATH;  Service: Cardiovascular;  Laterality: Left;     No family history on file.  Social History     Socioeconomic History    Marital status:    Tobacco Use    Smoking status: Never    Smokeless tobacco: Never   Substance and Sexual Activity    Alcohol use: Not Currently    Drug use: Never     Social Drivers of Health     Financial Resource Strain: Patient Declined (10/1/2024)    Overall Financial Resource Strain (CARDIA)     Difficulty of Paying Living Expenses: Patient declined   Food Insecurity: Patient Declined (10/1/2024)    Hunger Vital Sign     Worried About Running Out of Food in the Last Year: Patient declined     Ran Out of Food in the Last Year: Patient declined   Physical Activity: Sufficiently Active (10/1/2024)    Exercise Vital Sign     Days of Exercise per Week: 3 days     Minutes of Exercise per Session: 50 min   Stress: No Stress Concern Present (10/1/2024)    [employer] [place] [employer] - Occupational Stress Questionnaire     Feeling of Stress : Not at all   Housing Stability: Unknown (10/1/2024)    Housing Stability Vital Sign     Unable to Pay for Housing in the Last Year: Patient declined       Review of patient's allergies indicates:  No Known Allergies    Current Outpatient Medications:     aspirin (ECOTRIN) 81 MG EC tablet, Take 81 mg by mouth once daily., Disp: , Rfl:     atorvastatin (LIPITOR) 40 MG tablet, Take 1  tablet (40 mg total) by mouth every evening., Disp: 90 tablet, Rfl: 2    azelastine (ASTELIN) 137 mcg (0.1 %) nasal spray, 1 spray (137 mcg total) by Nasal route 2 (two) times daily., Disp: 30 mL, Rfl: 11    cholecalciferol, vitamin D3, 125 mcg (5,000 unit) capsule, Take 5,000 Units by mouth once daily., Disp: , Rfl:     clopidogreL (PLAVIX) 75 mg tablet, Take 1 tablet by mouth once daily., Disp: , Rfl:     coenzyme Q10 400 mg Cap, Take 400 mg by mouth once daily., Disp: , Rfl:     glucosamine/chondr acharya A sod (OSTEO BI-FLEX ORAL), Take 1 capsule by mouth once daily., Disp: , Rfl:     grape seed extract 150 mg TbSR, Take 3 capsules by mouth once daily., Disp: , Rfl:     Lactobacillus rhamnosus GG (CULTURELLE) 10 billion cell capsule, Take 1 capsule by mouth once daily., Disp: , Rfl:     lutein 20 mg Cap, Take 1 capsule by mouth once daily., Disp: , Rfl:     metoprolol tartrate (LOPRESSOR) 50 MG tablet, Take 1 tablet (50 mg total) by mouth 2 (two) times daily., Disp: 180 tablet, Rfl: 2    mv-min/vit C/glut/lysine/hb124 (IMMUNE SUPPORT ORAL), Take 1 tablet by mouth once daily., Disp: , Rfl:     omega-3/dha/epa/fish oil (FISH OIL HIGH POTENCY ORAL), Take 1 capsule by mouth once daily., Disp: , Rfl:     ramipriL (ALTACE) 5 MG capsule, Take 1 capsule (5 mg total) by mouth once daily., Disp: 90 capsule, Rfl: 2    resveratroL 100 mg Cap, Take by mouth., Disp: , Rfl:     saw palmetto 450 mg Cap, Take 1 capsule by mouth once daily., Disp: , Rfl:     tamsulosin (FLOMAX) 0.4 mg Cap, Take 1 capsule by mouth once daily., Disp: , Rfl:     vitamin E 400 UNIT capsule, Take 400 Units by mouth once daily., Disp: , Rfl:     zinc gluconate 50 mg tablet, Take 50 mg by mouth once daily., Disp: , Rfl:     Review of Systems   Constitutional: Positive for chills (RESOLVED) and malaise/fatigue (BETTER). Negative for decreased appetite, diaphoresis, fever and night sweats.   HENT:  Positive for hearing loss. Negative for congestion and  nosebleeds.    Eyes:  Negative for blurred vision and visual disturbance.   Cardiovascular:  Negative for chest pain, claudication, cyanosis, dyspnea on exertion, irregular heartbeat, leg swelling, near-syncope, orthopnea, palpitations, paroxysmal nocturnal dyspnea and syncope.   Respiratory:  Negative for cough, hemoptysis and shortness of breath.    Endocrine: Negative for polyuria.   Hematologic/Lymphatic: Negative for adenopathy. Does not bruise/bleed easily.   Skin:  Negative for color change and rash.   Musculoskeletal:  Negative for falls, joint swelling and muscle cramps.   Gastrointestinal:  Negative for abdominal pain, dysphagia, heartburn, jaundice, melena and nausea.   Genitourinary:  Negative for dysuria and flank pain.   Neurological:  Negative for brief paralysis, dizziness (ENT , HYDRATION), focal weakness, headaches, light-headedness, loss of balance, numbness and weakness. Vertigo: PT.  Psychiatric/Behavioral:  Negative for altered mental status and depression.    Allergic/Immunologic: Negative for persistent infections.        Objective:      Vitals:    12/12/24 1507 12/12/24 1524   BP: (!) 159/65 134/62   Pulse: 62    Weight: 88.2 kg (194 lb 7.1 oz)    Height: 6' (1.829 m)    PainSc: 0-No pain      Body mass index is 26.37 kg/m².    Physical Exam            ..    Chemistry        Component Value Date/Time     11/06/2024 1356    K 3.9 11/06/2024 1356     11/06/2024 1356    CO2 24 11/06/2024 1356    BUN 16 11/06/2024 1356    CREATININE 0.80 11/06/2024 1356    GLU 71 11/06/2024 1356        Component Value Date/Time    CALCIUM 8.8 11/06/2024 1356    ALKPHOS 47 09/01/2022 0847    AST 31 11/06/2024 1356    ALT 25 11/06/2024 1356    BILITOT 1.0 11/06/2024 1356            ..  Lab Results   Component Value Date    CHOL 121 11/06/2024    CHOL 131 08/23/2024    CHOL 116 02/02/2024     Lab Results   Component Value Date    HDL 48 11/06/2024    HDL 56 08/23/2024    HDL 41 02/02/2024     Lab  "Results   Component Value Date    LDLCALC 62 11/06/2024    LDLCALC 61 08/23/2024    LDLCALC 61 02/02/2024     Lab Results   Component Value Date    TRIG 44 11/06/2024    TRIG 68 08/23/2024    TRIG 69 02/02/2024     No results found for: "CHOLHDL"  ..  Lab Results   Component Value Date    WBC 9.2 08/23/2024    HGB 15.9 08/23/2024    HCT 47.1 08/23/2024    MCV 91 02/02/2024     08/23/2024       Test(s) Reviewed  I have reviewed the following in detail:  [] Stress test   [] Angiography   [] Echocardiogram   [] Labs   [] Other:       Assessment:         ICD-10-CM ICD-9-CM   1. Other fatigue  R53.83 780.79   2. Vertigo  R42 780.4   3. Coronary artery disease involving native coronary artery of native heart without angina pectoris  I25.10 414.01     Problem List Items Addressed This Visit          ENT    Vertigo       Cardiac/Vascular    Coronary artery disease       Other    Other fatigue - Primary        Plan:     HYDRATION, KEEP APPT, HBP OTC MEDS ONLY, EXPLAINED EFFECT ON BLOOD PRESSURE,    ALL CV CLINICALLY STABLE, NO ANGINA, NO HF, NO TIA, NO CLINICAL ARRHYTHMIA,CONTINUE CURRENT MEDS, EDUCATION, DIET, EXERCISE         Other fatigue    Vertigo    Coronary artery disease involving native coronary artery of native heart without angina pectoris    RTC Low level/low impact aerobic exercise 5x's/wk. Heart healthy diet and risk factor modification.    See labs and med orders.    Aerobic exercise 5x's/wk. Heart healthy diet and risk factor modification.    See labs and med orders.        "

## 2025-02-19 ENCOUNTER — PATIENT MESSAGE (OUTPATIENT)
Dept: OTOLARYNGOLOGY | Facility: CLINIC | Age: 80
End: 2025-02-19
Payer: MEDICARE

## 2025-03-03 ENCOUNTER — TELEPHONE (OUTPATIENT)
Dept: DERMATOLOGY | Facility: CLINIC | Age: 80
End: 2025-03-03
Payer: MEDICARE

## 2025-03-03 NOTE — TELEPHONE ENCOUNTER
----- Message from Kassi sent at 3/3/2025 10:30 AM CST -----  Contact: Clara/ Spouse  Type:  Sooner Apoointment RequestCaller is requesting a sooner appointment.  Caller declined first available appointment listed below.  Caller will not accept being placed on the waitlist and is requesting a message be sent to doctor.Name of Caller:ClaraMarycruz is the first available appointment?NoneSymptoms:Rash on scalp areaWould the patient rather a call back or a response via [facility]? Call [facility] Call Back Number:[phone] Information: Patient was seen a few years ago but she stated he wasn't a new patient, [facility]

## 2025-08-12 ENCOUNTER — TELEPHONE (OUTPATIENT)
Dept: CARDIOLOGY | Facility: CLINIC | Age: 80
End: 2025-08-12
Payer: MEDICARE

## 2025-08-12 DIAGNOSIS — E78.00 PURE HYPERCHOLESTEROLEMIA: ICD-10-CM

## 2025-08-12 DIAGNOSIS — I10 PRIMARY HYPERTENSION: ICD-10-CM

## 2025-08-12 DIAGNOSIS — I70.0 AORTIC ATHEROSCLEROSIS: ICD-10-CM

## 2025-08-12 DIAGNOSIS — I25.10 CORONARY ARTERY DISEASE INVOLVING NATIVE CORONARY ARTERY OF NATIVE HEART WITHOUT ANGINA PECTORIS: Primary | ICD-10-CM

## 2025-08-12 DIAGNOSIS — E78.49 OTHER HYPERLIPIDEMIA: ICD-10-CM
